# Patient Record
Sex: FEMALE | Race: WHITE | Employment: UNEMPLOYED | URBAN - METROPOLITAN AREA
[De-identification: names, ages, dates, MRNs, and addresses within clinical notes are randomized per-mention and may not be internally consistent; named-entity substitution may affect disease eponyms.]

---

## 2020-01-27 ENCOUNTER — OFFICE VISIT (OUTPATIENT)
Dept: FAMILY MEDICINE CLINIC | Age: 40
End: 2020-01-27
Payer: COMMERCIAL

## 2020-01-27 VITALS
HEART RATE: 60 BPM | SYSTOLIC BLOOD PRESSURE: 120 MMHG | DIASTOLIC BLOOD PRESSURE: 76 MMHG | TEMPERATURE: 98.2 F | RESPIRATION RATE: 18 BRPM | BODY MASS INDEX: 23.66 KG/M2 | WEIGHT: 142 LBS | HEIGHT: 65 IN | OXYGEN SATURATION: 99 %

## 2020-01-27 LAB — S PYO AG THROAT QL: NORMAL

## 2020-01-27 PROCEDURE — 99213 OFFICE O/P EST LOW 20 MIN: CPT | Performed by: PHYSICIAN ASSISTANT

## 2020-01-27 PROCEDURE — G8484 FLU IMMUNIZE NO ADMIN: HCPCS | Performed by: PHYSICIAN ASSISTANT

## 2020-01-27 PROCEDURE — G8427 DOCREV CUR MEDS BY ELIG CLIN: HCPCS | Performed by: PHYSICIAN ASSISTANT

## 2020-01-27 PROCEDURE — G8420 CALC BMI NORM PARAMETERS: HCPCS | Performed by: PHYSICIAN ASSISTANT

## 2020-01-27 PROCEDURE — 87880 STREP A ASSAY W/OPTIC: CPT | Performed by: PHYSICIAN ASSISTANT

## 2020-01-27 PROCEDURE — 1036F TOBACCO NON-USER: CPT | Performed by: PHYSICIAN ASSISTANT

## 2020-01-27 RX ORDER — CEFDINIR 300 MG/1
300 CAPSULE ORAL 2 TIMES DAILY
Qty: 20 CAPSULE | Refills: 0 | Status: SHIPPED | OUTPATIENT
Start: 2020-01-27 | End: 2020-02-06

## 2020-01-27 RX ORDER — ZOLPIDEM TARTRATE 10 MG/1
TABLET ORAL
COMMUNITY
End: 2020-03-13 | Stop reason: ALTCHOICE

## 2020-03-13 ENCOUNTER — OFFICE VISIT (OUTPATIENT)
Dept: FAMILY MEDICINE CLINIC | Age: 40
End: 2020-03-13
Payer: COMMERCIAL

## 2020-03-13 VITALS
TEMPERATURE: 98.8 F | SYSTOLIC BLOOD PRESSURE: 124 MMHG | DIASTOLIC BLOOD PRESSURE: 78 MMHG | BODY MASS INDEX: 23.46 KG/M2 | WEIGHT: 141 LBS | HEART RATE: 95 BPM | RESPIRATION RATE: 18 BRPM

## 2020-03-13 PROCEDURE — 69210 REMOVE IMPACTED EAR WAX UNI: CPT | Performed by: PHYSICIAN ASSISTANT

## 2020-03-13 PROCEDURE — G8427 DOCREV CUR MEDS BY ELIG CLIN: HCPCS | Performed by: PHYSICIAN ASSISTANT

## 2020-03-13 PROCEDURE — 99214 OFFICE O/P EST MOD 30 MIN: CPT | Performed by: PHYSICIAN ASSISTANT

## 2020-03-13 PROCEDURE — G8420 CALC BMI NORM PARAMETERS: HCPCS | Performed by: PHYSICIAN ASSISTANT

## 2020-03-13 PROCEDURE — G8484 FLU IMMUNIZE NO ADMIN: HCPCS | Performed by: PHYSICIAN ASSISTANT

## 2020-03-13 PROCEDURE — 1036F TOBACCO NON-USER: CPT | Performed by: PHYSICIAN ASSISTANT

## 2020-03-13 RX ORDER — CHLORPHENIRAMINE MALEATE 4 MG/1
4 TABLET ORAL EVERY 6 HOURS PRN
Qty: 20 TABLET | Refills: 0 | Status: SHIPPED
Start: 2020-03-13 | End: 2021-02-08

## 2020-03-13 RX ORDER — METHYLPREDNISOLONE 4 MG/1
TABLET ORAL
Qty: 1 KIT | Refills: 0 | Status: SHIPPED
Start: 2020-03-13 | End: 2021-02-08

## 2020-03-13 RX ORDER — AMOXICILLIN AND CLAVULANATE POTASSIUM 875; 125 MG/1; MG/1
1 TABLET, FILM COATED ORAL 2 TIMES DAILY
Qty: 20 TABLET | Refills: 0 | Status: SHIPPED | OUTPATIENT
Start: 2020-03-13 | End: 2020-03-23

## 2020-03-13 NOTE — PROGRESS NOTES
3/13/20  Dougie Pinon : 1980 Sex: female  Age 37 y.o. Subjective:  Chief Complaint   Patient presents with    Otalgia     Started Monday antibiotic drops made it worse     Cough    Chest Congestion         HPI:   Dougie Pinon , 37 y.o. female presents to [facility] care for evaluation of ear pain, cough, congestion, drainage. The patient has had the symptoms for about a week now. The patient was actually evaluated in [facility] and was diagnosed with a right otitis externa and evidence of a left cerumen impaction. The patient states that the drops are actually making the symptoms worse. The patient is still coughing still congested. The patient is on any chest pain, shortness of breath, abdominal pain. No back pain or flank pain. The patient is eating and drinking normally. ROS:   Unless otherwise stated in this report the patient's positive and negative responses for review of systems for constitutional, eyes, ENT, cardiovascular, respiratory, gastrointestinal, neurological, , musculoskeletal, and integument systems and related systems to the presenting problem are either stated in the history of present illness or were not pertinent or were negative for the symptoms and/or complaints related to the presenting medical problem. Positives and pertinent negatives as per HPI. All others reviewed and are negative. PMH:     Past Medical History:   Diagnosis Date    Abnormal Pap smear     Anxiety     [facility]     Asthma     Hx LEEP (loop electrosurgical excision procedure), cervix, pregnancy     Major depressive disorder, recurrent episode, unspecified 2011    Mental disorders of mother, antepartum 2011       Past Surgical History:   Procedure Laterality Date    BREAST SURGERY      abscess removed from left breast     SECTION      LEEP         History reviewed. No pertinent family history.     Medications:     Current Outpatient Medications:   amoxicillin-clavulanate (AUGMENTIN) 875-125 MG per tablet, Take 1 tablet by mouth 2 times daily for 10 days, Disp: 20 tablet, Rfl: 0    methylPREDNISolone (MEDROL DOSEPACK) 4 MG tablet, Take by mouth., Disp: 1 kit, Rfl: 0    chlorpheniramine (ALLER-CHLOR) 4 MG tablet, Take 1 tablet by mouth every 6 hours as needed for Allergies, Disp: 20 tablet, Rfl: 0    ALPRAZolam (XANAX) 1 MG tablet, , Disp: , Rfl:     Allergies:   No Known Allergies    Social History:     Social History     Tobacco Use    Smoking status: Former Smoker    Smokeless tobacco: Never Used   Substance Use Topics    Alcohol use: Yes     Comment: occassionaly    Drug use: No       Patient lives at home. Physical Exam:     Vitals:    03/13/20 1154   BP: 124/78   Pulse: 95   Resp: 18   Temp: 98.8 °F (37.1 °C)   SpO2: Comment: unable to assess due to artificial nails   Weight: 141 lb (64 kg)       Exam:  Physical Exam  Nurse's notes and vital signs reviewed. The patient is not hypoxic. ? General: Alert, no acute distress, patient resting comfortably Patient is not toxic or lethargic. Skin: Warm, intact, no pallor noted. There is no evidence of rash at this time. Head: Normocephalic, atraumatic  Eye: Normal conjunctiva  Ears, Nose, Throat: Right tympanic membrane erythematous and bulging but no evidence of perforation, no foreign body, left tympanic membrane not able to be visualized because of cerumen impaction. No drainage or discharge noted. No pre- or post-auricular tenderness, erythema, or swelling noted. Moderate nasal congestion, rhinorrhea, no epistaxis  Posterior oropharynx shows erythema and cobblestoning but no evidence of tonsillar hypertrophy, or exudate. the uvula is midline. No trismus or drooling is noted. Moist mucous membranes. Neck: No anterior/posterior lymphadenopathy noted. No erythema, no masses, no fluctuance or induration noted. No meningeal signs.   Cardiovascular: Regular Rate and Rhythm  Respiratory: No

## 2020-03-18 ENCOUNTER — OFFICE VISIT (OUTPATIENT)
Dept: PRIMARY CARE CLINIC | Age: 40
End: 2020-03-18
Payer: COMMERCIAL

## 2020-03-18 VITALS
HEIGHT: 65 IN | TEMPERATURE: 98.3 F | SYSTOLIC BLOOD PRESSURE: 130 MMHG | BODY MASS INDEX: 23.49 KG/M2 | RESPIRATION RATE: 14 BRPM | WEIGHT: 141 LBS | DIASTOLIC BLOOD PRESSURE: 90 MMHG | HEART RATE: 84 BPM | OXYGEN SATURATION: 99 %

## 2020-03-18 PROCEDURE — G8420 CALC BMI NORM PARAMETERS: HCPCS | Performed by: FAMILY MEDICINE

## 2020-03-18 PROCEDURE — 99213 OFFICE O/P EST LOW 20 MIN: CPT | Performed by: FAMILY MEDICINE

## 2020-03-18 PROCEDURE — 1036F TOBACCO NON-USER: CPT | Performed by: FAMILY MEDICINE

## 2020-03-18 PROCEDURE — G8484 FLU IMMUNIZE NO ADMIN: HCPCS | Performed by: FAMILY MEDICINE

## 2020-03-18 PROCEDURE — G8427 DOCREV CUR MEDS BY ELIG CLIN: HCPCS | Performed by: FAMILY MEDICINE

## 2020-03-18 RX ORDER — BENZONATATE 100 MG/1
100 CAPSULE ORAL 2 TIMES DAILY PRN
Qty: 20 CAPSULE | Refills: 0 | Status: SHIPPED | OUTPATIENT
Start: 2020-03-18 | End: 2020-03-28

## 2020-03-18 NOTE — LETTER
[address]  [facility] [address]  Phone: [phone]  Fax: [phone]    Kat Patino MD        March 18, 2020     Patient: Mila Mcginnis   YOB: 1980   Date of Visit: 3/18/2020       To Whom it May Concern:    Mila Mcginnis was seen in my clinic on 3/18/2020. She may return to work on 2 weeks. If you have any questions or concerns, please don't hesitate to call.     Sincerely,         Kat Patino MD

## 2020-03-18 NOTE — PATIENT INSTRUCTIONS
The results and recommendations made for you today reflect your status at this time. Your condition is subject to change. Should your symptoms worsen, please seek additional medical attention. Preventing the Spread of Coronavirus Disease 2019 in Homes and Residential Communities  Interim Guidance      Prevention steps for people with confirmed or suspected COVID-19 (including persons under investigation) who do not need to be hospitalized and people with confirmed COVID-19 who were hospitalized and determined to be medically stable to go home    Your healthcare provider and public health staff will evaluate whether you can be cared for at home. If it is determined that you do not need to be hospitalized and can be isolated at home, you will be monitored by staff from your local or state health department. You should follow the prevention steps below until a healthcare provider or local or state health department says you can return to your normal activities. Stay home except to get medical care  People who are mildly ill with COVID-19 are able to isolate at home during their illness. You should restrict activities outside your home, except for getting medical care. Do not go to work, school, or public areas. Avoid using public transportation, ride-sharing, or taxis. Separate yourself from other people and animals in your home  People: As much as possible, you should stay in a specific room and away from other people in your home. Also, you should use a separate bathroom, if available. Animals: You should restrict contact with pets and other animals while you are sick with COVID-19, just like you would around other people. Although there have not been reports of pets or other animals becoming sick with COVID-19, it is still recommended that people sick with COVID-19 limit contact with animals until more information is known about the virus.  When possible, have another member of your household care for your animals while you are sick. If you are sick with COVID-19, avoid contact with your pet, including petting, snuggling, being kissed or licked, and sharing food. If you must care for your pet or be around animals while you are sick, wash your hands before and after you interact with pets and wear a facemask. See COVID-19 and Animals for more information. Call ahead before visiting your doctor  If you have a medical appointment, call the healthcare provider and tell them that you have or may have COVID-19. This will help the healthcare providers office take steps to keep other people from getting infected or exposed. Wear a facemask  You should wear a facemask when you are around other people (e.g., sharing a room or vehicle) or pets and before you enter a healthcare providers office. If you are not able to wear a facemask (for example, because it causes trouble breathing), then people who live with you should not stay in the same room with you, or they should wear a facemask if they enter your room. Cover your coughs and sneezes  Cover your mouth and nose with a tissue when you cough or sneeze. Throw used tissues in a lined trash can. Immediately wash your hands with soap and water for at least 20 seconds or, if soap and water are not available, clean your hands with an alcohol-based hand  that contains at least 60% alcohol. Clean your hands often  Wash your hands often with soap and water for at least 20 seconds, especially after blowing your nose, coughing, or sneezing; going to the bathroom; and before eating or preparing food. If soap and water are not readily available, use an alcohol-based hand  with at least 60% alcohol, covering all surfaces of your hands and rubbing them together until they feel dry. Soap and water are the best option if hands are visibly dirty. Avoid touching your eyes, nose, and mouth with unwashed hands.     Avoid sharing personal household items  You should not share dishes, drinking glasses, cups, eating utensils, towels, or bedding with other people or pets in your home. After using these items, they should be washed thoroughly with soap and water. Clean all high-touch surfaces everyday  High touch surfaces include counters, tabletops, doorknobs, bathroom fixtures, toilets, phones, keyboards, tablets, and bedside tables. Also, clean any surfaces that may have blood, stool, or body fluids on them. Use a household cleaning spray or wipe, according to the label instructions. Labels contain instructions for safe and effective use of the cleaning product including precautions you should take when applying the product, such as wearing gloves and making sure you have good ventilation during use of the product. Monitor your symptoms  Seek prompt medical attention if your illness is worsening (e.g., difficulty breathing). Before seeking care, call your healthcare provider and tell them that you have, or are being evaluated for, COVID-19. Put on a facemask before you enter the facility. These steps will help the healthcare providers office to keep other people in the office or waiting room from getting infected or exposed. Ask your healthcare provider to call the local or state health department. Persons who are placed under active monitoring or facilitated self-monitoring should follow instructions provided by their local health department or occupational health professionals, as appropriate. If you have a medical emergency and need to call 911, notify the dispatch personnel that you have, or are being evaluated for COVID-19. If possible, put on a facemask before emergency medical services arrive. Discontinuing home isolation  Patients with confirmed COVID-19 should remain under home isolation precautions until the risk of secondary transmission to others is thought to be low.  The decision to discontinue home isolation precautions should be made on a case-by-case basis, in consultation with healthcare providers and state and local health departments. Recommended precautions for household members, intimate partners, and caregivers in a nonhealthcare setting a patient with symptomatic laboratory-confirmed COVID-19 or a patient under investigation (PUI)    Household members, intimate partners, and caregivers in a nonhealthcare setting may have close contact with a person with symptomatic, laboratory-confirmed COVID-19 or a person under investigation. Close contacts should monitor their health; they should call their healthcare provider right away if they develop symptoms suggestive of COVID-19 (e.g., fever, cough, shortness of breath) (see Interim [place] Guidance for Risk Assessment and Public Health Management of Persons with Potential Coronavirus Disease 2019 (COVID-19) Exposure in Travel-associated or [employer].)    Close contacts should also follow these recommendations:    -Make sure that you understand and can help the patient follow their healthcare providers instructions for medication(s) and care. You should help the patient with basic needs in the home and provide support for getting groceries, prescriptions, and other personal needs. -Monitor the patients symptoms. If the patient is getting sicker, call his or her healthcare provider and tell them that the patient has laboratory-confirmed COVID-19. This will help the healthcare providers office take steps to keep other people in the office or waiting room from getting infected. Ask the healthcare provider to call the local or state health department for additional guidance. If the patient has a medical emergency and you need to call 911, notify the dispatch personnel that the patient has, or is being evaluated for COVID-19.  -Household members should stay in another room or be  from the patient as much as possible.  Household members should use a separate bedroom and bathroom, if available. -Prohibit visitors who do not have an essential need to be in the home.  -Household members should care for any pets in the home. Do not handle pets or other animals while sick. For more information, see COVID-19 and Animals. -Make sure that shared spaces in the home have good air flow, such as by an air conditioner or an opened window, weather permitting.  -Perform hand hygiene frequently. Wash your hands often with soap and water for at least 20 seconds or use an alcohol-based hand  that contains 60 to 95% alcohol, covering all surfaces of your hands and rubbing them together until they feel dry. Soap and water should be used preferentially if hands are visibly dirty.  -Avoid touching your eyes, nose, and mouth with unwashed hands.  -The patient should wear a facemask when you are around other people. If the patient is not able to wear a facemask (for example, because it causes trouble breathing), you, as the caregiver, should wear a mask when you are in the same room as the patient.  -Wear a disposable facemask and gloves when you touch or have contact with the patients blood, stool, or body fluids, such as saliva, sputum, nasal mucus, vomit, urine.  -Throw out disposable facemasks and gloves after using them. Do not reuse.  -When removing personal protective equipment, first remove and dispose of gloves. Then, immediately clean your hands with soap and water or alcohol-based hand . Next, remove and dispose of facemask, and immediately clean your hands again with soap and water or alcohol-based hand .  -Avoid sharing household items with the patient. You should not share dishes, drinking glasses, cups, eating utensils, towels, bedding, or other items. After the patient uses these items, you should wash them thoroughly (see below [employer]).   -Clean all high-touch surfaces, such as counters, tabletops, doorknobs, bathroom fixtures, toilets,

## 2021-02-08 ENCOUNTER — OFFICE VISIT (OUTPATIENT)
Dept: PRIMARY CARE CLINIC | Age: 41
End: 2021-02-08
Payer: COMMERCIAL

## 2021-02-08 VITALS
BODY MASS INDEX: 28.27 KG/M2 | RESPIRATION RATE: 16 BRPM | HEART RATE: 76 BPM | TEMPERATURE: 97.7 F | DIASTOLIC BLOOD PRESSURE: 76 MMHG | WEIGHT: 144 LBS | HEIGHT: 60 IN | SYSTOLIC BLOOD PRESSURE: 112 MMHG | OXYGEN SATURATION: 97 %

## 2021-02-08 DIAGNOSIS — M54.41 CHRONIC RIGHT-SIDED LOW BACK PAIN WITH RIGHT-SIDED SCIATICA: ICD-10-CM

## 2021-02-08 DIAGNOSIS — E03.8 SUBCLINICAL HYPOTHYROIDISM: ICD-10-CM

## 2021-02-08 DIAGNOSIS — F33.1 MODERATE EPISODE OF RECURRENT MAJOR DEPRESSIVE DISORDER (HCC): ICD-10-CM

## 2021-02-08 DIAGNOSIS — Z76.89 ESTABLISHING CARE WITH NEW DOCTOR, ENCOUNTER FOR: ICD-10-CM

## 2021-02-08 DIAGNOSIS — Z13.220 LIPID SCREENING: ICD-10-CM

## 2021-02-08 DIAGNOSIS — M54.41 CHRONIC RIGHT-SIDED LOW BACK PAIN WITH RIGHT-SIDED SCIATICA: Primary | ICD-10-CM

## 2021-02-08 DIAGNOSIS — G89.29 CHRONIC RIGHT-SIDED LOW BACK PAIN WITH RIGHT-SIDED SCIATICA: ICD-10-CM

## 2021-02-08 DIAGNOSIS — L30.9 DERMATITIS: Primary | ICD-10-CM

## 2021-02-08 DIAGNOSIS — M79.7 FIBROMYALGIA: ICD-10-CM

## 2021-02-08 DIAGNOSIS — F41.9 ANXIETY: ICD-10-CM

## 2021-02-08 DIAGNOSIS — E55.9 VITAMIN D DEFICIENCY: ICD-10-CM

## 2021-02-08 DIAGNOSIS — G89.29 CHRONIC RIGHT-SIDED LOW BACK PAIN WITH RIGHT-SIDED SCIATICA: Primary | ICD-10-CM

## 2021-02-08 LAB
ALBUMIN SERPL-MCNC: 4.5 G/DL (ref 3.5–5.2)
ALP BLD-CCNC: 38 U/L (ref 35–104)
ALT SERPL-CCNC: 9 U/L (ref 0–32)
ANION GAP SERPL CALCULATED.3IONS-SCNC: 17 MMOL/L (ref 7–16)
AST SERPL-CCNC: 21 U/L (ref 0–31)
BASOPHILS ABSOLUTE: 0.04 E9/L (ref 0–0.2)
BASOPHILS RELATIVE PERCENT: 1 % (ref 0–2)
BILIRUB SERPL-MCNC: 0.2 MG/DL (ref 0–1.2)
BUN BLDV-MCNC: 22 MG/DL (ref 6–20)
CALCIUM SERPL-MCNC: 9.5 MG/DL (ref 8.6–10.2)
CHLORIDE BLD-SCNC: 108 MMOL/L (ref 98–107)
CHOLESTEROL, TOTAL: 205 MG/DL (ref 0–199)
CO2: 18 MMOL/L (ref 22–29)
CREAT SERPL-MCNC: 0.6 MG/DL (ref 0.5–1)
EOSINOPHILS ABSOLUTE: 0.18 E9/L (ref 0.05–0.5)
EOSINOPHILS RELATIVE PERCENT: 4.4 % (ref 0–6)
GFR AFRICAN AMERICAN: >60
GFR NON-AFRICAN AMERICAN: >60 ML/MIN/1.73
GLUCOSE BLD-MCNC: 84 MG/DL (ref 74–99)
HCT VFR BLD CALC: 40.2 % (ref 34–48)
HDLC SERPL-MCNC: 116 MG/DL
HEMOGLOBIN: 12.2 G/DL (ref 11.5–15.5)
IMMATURE GRANULOCYTES #: 0.01 E9/L
IMMATURE GRANULOCYTES %: 0.2 % (ref 0–5)
LDL CHOLESTEROL CALCULATED: 82 MG/DL (ref 0–99)
LYMPHOCYTES ABSOLUTE: 1.77 E9/L (ref 1.5–4)
LYMPHOCYTES RELATIVE PERCENT: 43.3 % (ref 20–42)
MCH RBC QN AUTO: 31.9 PG (ref 26–35)
MCHC RBC AUTO-ENTMCNC: 30.3 % (ref 32–34.5)
MCV RBC AUTO: 105.2 FL (ref 80–99.9)
MONOCYTES ABSOLUTE: 0.41 E9/L (ref 0.1–0.95)
MONOCYTES RELATIVE PERCENT: 10 % (ref 2–12)
NEUTROPHILS ABSOLUTE: 1.68 E9/L (ref 1.8–7.3)
NEUTROPHILS RELATIVE PERCENT: 41.1 % (ref 43–80)
PDW BLD-RTO: 13 FL (ref 11.5–15)
PLATELET # BLD: 240 E9/L (ref 130–450)
PMV BLD AUTO: 11.1 FL (ref 7–12)
POTASSIUM SERPL-SCNC: 4.6 MMOL/L (ref 3.5–5)
RBC # BLD: 3.82 E12/L (ref 3.5–5.5)
RHEUMATOID FACTOR: 10 IU/ML (ref 0–13)
SEDIMENTATION RATE, ERYTHROCYTE: 5 MM/HR (ref 0–20)
SODIUM BLD-SCNC: 143 MMOL/L (ref 132–146)
T4 FREE: 1.11 NG/DL (ref 0.93–1.7)
TOTAL PROTEIN: 7.6 G/DL (ref 6.4–8.3)
TRIGL SERPL-MCNC: 37 MG/DL (ref 0–149)
TSH SERPL DL<=0.05 MIU/L-ACNC: 1.24 UIU/ML (ref 0.27–4.2)
VITAMIN D 25-HYDROXY: 38 NG/ML (ref 30–100)
VLDLC SERPL CALC-MCNC: 7 MG/DL
WBC # BLD: 4.1 E9/L (ref 4.5–11.5)

## 2021-02-08 PROCEDURE — G8419 CALC BMI OUT NRM PARAM NOF/U: HCPCS | Performed by: FAMILY MEDICINE

## 2021-02-08 PROCEDURE — G8484 FLU IMMUNIZE NO ADMIN: HCPCS | Performed by: FAMILY MEDICINE

## 2021-02-08 PROCEDURE — 99214 OFFICE O/P EST MOD 30 MIN: CPT | Performed by: FAMILY MEDICINE

## 2021-02-08 PROCEDURE — G8427 DOCREV CUR MEDS BY ELIG CLIN: HCPCS | Performed by: FAMILY MEDICINE

## 2021-02-08 PROCEDURE — 1036F TOBACCO NON-USER: CPT | Performed by: FAMILY MEDICINE

## 2021-02-08 PROCEDURE — 93000 ELECTROCARDIOGRAM COMPLETE: CPT | Performed by: FAMILY MEDICINE

## 2021-02-08 RX ORDER — DESVENLAFAXINE 50 MG/1
1 TABLET, EXTENDED RELEASE ORAL DAILY
COMMUNITY
Start: 2021-01-29 | End: 2022-05-25

## 2021-02-08 RX ORDER — DEXTROAMPHETAMINE SACCHARATE, AMPHETAMINE ASPARTATE MONOHYDRATE, DEXTROAMPHETAMINE SULFATE AND AMPHETAMINE SULFATE 6.25; 6.25; 6.25; 6.25 MG/1; MG/1; MG/1; MG/1
1 CAPSULE, EXTENDED RELEASE ORAL 2 TIMES DAILY
COMMUNITY
Start: 2021-01-29

## 2021-02-08 RX ORDER — TRAZODONE HYDROCHLORIDE 150 MG/1
1 TABLET ORAL DAILY
COMMUNITY
Start: 2021-01-29

## 2021-02-08 ASSESSMENT — ENCOUNTER SYMPTOMS
SORE THROAT: 0
PHOTOPHOBIA: 0
EYE REDNESS: 0
SHORTNESS OF BREATH: 0
NAUSEA: 0
BLOOD IN STOOL: 0
DIARRHEA: 0
ABDOMINAL PAIN: 0
VOMITING: 0
COUGH: 0
BACK PAIN: 1
RHINORRHEA: 0
CONSTIPATION: 0
WHEEZING: 0

## 2021-02-08 NOTE — PROGRESS NOTES
2021    Chief Complaint   Patient presents with   [facility] Established New Doctor    Eczema     mostly on face, ears, and scalp    Excessive Sweating     under arm, has had issue since she was younger    Lower Back Pain     feels like she needs MRI pain gradually getting worse      HPI  Brenna Fontanez (:  1980) is a 41 y.o. female, here for evaluation of the following medical concerns:    Patient is a 26-year-old female with a past medical history subclinical hypothyroidism, chronic low back pain who presents today to establish care and myself. Anxiety/Depression: Not under good control. No SI/HI. Patient is following with psychiatry regards this issue. Patient reports that they're actively changing her medications due to poor control. Patient is on several medications that are also used to help manage her migraines. Patient still has several migraines throughout the month. Patient does have aura with floaters and visual changes per her report. Eczema: Dandruff, patches on her face. Started about a few months ago. Reports she has tried OTC therapies without relief. Patient had autoimmune work-up in the past and repeat ordered which she did not complete. Excessive sweating from armpits: Ongoing since childhood. Low Back Pain: Patient reports issues with this for years. Reports days when she can not walk due to pain down right leg and radicular symptoms. Patient reports pain along the right side with radiation down her right leg. No red flag symptoms. Reports abnormal MRI in the past. Has tried PT. Patient reports that she has scoliosis and possibly DDD in the lumbar spine. Ob/GYN: Patient has not seen an Kaela Garcia in several years. Patient reports abnormal cramping in her lower pelvic region that is especially worse during her menstrual cycles. Patient will need follow-up with ObGyn for this issue. HM needs updated.      Review of Systems Constitutional: Negative for chills and fever. HENT: Negative for congestion, hearing loss, postnasal drip, rhinorrhea, sneezing, sore throat and tinnitus. Eyes: Negative for photophobia and redness. Respiratory: Negative for cough, shortness of breath and wheezing. Cardiovascular: Negative for chest pain, palpitations and leg swelling. Gastrointestinal: Negative for abdominal pain, blood in stool, constipation, diarrhea, nausea and vomiting. Endocrine: Negative for polydipsia and polyuria. Genitourinary: Negative for difficulty urinating, dysuria and hematuria. Musculoskeletal: Positive for arthralgias, back pain and neck pain. Skin: Negative for rash. Neurological: Positive for weakness. Negative for dizziness, light-headedness, numbness and headaches. Psychiatric/Behavioral: Positive for decreased concentration. Negative for suicidal ideas. The patient is nervous/anxious. Prior to Visit Medications    Medication Sig Taking? Authorizing Provider   topiramate (TOPAMAX) 200 MG tablet Take 1 tablet by mouth daily Yes Historical Provider, MD   desvenlafaxine succinate (PRISTIQ) 50 MG TB24 extended release tablet Take 1 tablet by mouth daily Yes Historical Provider, MD   amphetamine-dextroamphetamine (ADDERALL XR) 25 MG extended release capsule Take 1 capsule by mouth 2 times daily.  Yes Historical Provider, MD   traZODone (DESYREL) 150 MG tablet Take 1 tablet by mouth daily Yes Historical Provider, MD   ALPRAZolam [place]) 1 MG tablet  Yes Historical Provider, MD        No Known Allergies    Past Medical History:   Diagnosis Date    Abnormal Pap smear     Anxiety     [facility]     Asthma     Hx LEEP (loop electrosurgical excision procedure), cervix, pregnancy     Major depressive disorder, recurrent episode, unspecified 2011    Mental disorders of mother, antepartum 2011        Menstrual History:  OB History        5    Para   4    Term   4         AB        Living   3       SAB        TAB        Ectopic        Molar        Multiple   1    Live Births   3                    Past Surgical History:   Procedure Laterality Date    BREAST SURGERY      abscess removed from left breast     SECTION      LEEP         Family History   Problem Relation Age of Onset    Breast Cancer Father        Immunization History   Administered Date(s) Administered    Influenza Virus Vaccine 10/21/2015       Health Maintenance   Topic Date Due    Hepatitis C screen  1980    DTaP/Tdap/Td vaccine (1 - Tdap) 10/10/1997    Cervical cancer screen  10/10/1999    Lipid screen  10/10/2018    Breast cancer screen  10/10/2018    Diabetes screen  10/10/2018    Flu vaccine (1) 2020    HIV screen  Completed    Hepatitis A vaccine  Aged Out    Hepatitis B vaccine  Aged Out    Hib vaccine  Aged Out    Meningococcal (ACWY) vaccine  Aged Out    Pneumococcal 0-64 years Vaccine  Aged Out       Social History     Socioeconomic History    Marital status:      Spouse name: Not on file    Number of children: Not on file    Years of education: Not on file    Highest education level: Not on file   Occupational History    Not on file   Social Needs    Financial resource strain: Not on file    Food insecurity     Worry: Not on file     Inability: Not on file   [place] needs     Medical: Not on file     Non-medical: Not on file   Tobacco Use    Smoking status: Former Smoker    Smokeless tobacco: Never Used   Substance and Sexual Activity    Alcohol use: Yes     Frequency: Never     Drinks per session: 1 or 2     Binge frequency: Never     Comment: occassionaly    Drug use: No    Sexual activity: Yes     Partners: Male   Lifestyle    Physical activity     Days per week: Not on file     Minutes per session: Not on file    Stress: Not on file   Relationships    Social connections     Talks on phone: Not on file Gets together: Not on file     Attends Congregation service: Not on file     Active member of club or organization: Not on file     Attends meetings of clubs or organizations: Not on file     Relationship status: Not on file    Intimate partner violence     Fear of current or ex partner: Not on file     Emotionally abused: Not on file     Physically abused: Not on file     Forced sexual activity: Not on file   Other Topics Concern    Not on file   Social History Narrative    Not on file         Vitals:    02/08/21 0954   BP: 112/76   Pulse: 76   Resp: 16   Temp: 97.7 °F (36.5 °C)   TempSrc: Temporal   SpO2: 97%   Weight: 144 lb (65.3 kg)   Height: 5' (1.524 m)       Estimated body mass index is 28.12 kg/m² as calculated from the following:    Height as of this encounter: 5' (1.524 m). Weight as of this encounter: 144 lb (65.3 kg). Physical Exam  Vitals signs and nursing note reviewed. Constitutional:       Appearance: She is well-developed. HENT:      Head: Normocephalic. Right Ear: External ear normal.      Left Ear: External ear normal.   Eyes:      Extraocular Movements: Extraocular movements intact. Conjunctiva/sclera: Conjunctivae normal.      Pupils: Pupils are equal, round, and reactive to light. Cardiovascular:      Rate and Rhythm: Normal rate and regular rhythm. Pulses:           Radial pulses are 2+ on the right side and 2+ on the left side. Dorsalis pedis pulses are 2+ on the right side and 2+ on the left side. Posterior tibial pulses are 2+ on the right side and 2+ on the left side. Heart sounds: Normal heart sounds. No murmur. Comments: Capillary refill normal.  Pulmonary:      Effort: Pulmonary effort is normal. No respiratory distress. Breath sounds: Normal breath sounds. No decreased breath sounds, wheezing or rales. Abdominal:      General: Bowel sounds are normal. There is no distension. Palpations: Abdomen is soft. Tenderness: There is no abdominal tenderness. There is no rebound. Musculoskeletal: Normal range of motion. Lumbar back: She exhibits no bony tenderness and no deformity. Right lower leg: No edema. Left lower leg: No edema. Comments: Straight leg raiser is negative   bilaterally. Mild decrease in muscle strength secondary to pain. Mild decreased range of motion of the lumbar spine secondary to pain. Pain on palpation of the right paraspinal musculature. Skin:     General: Skin is warm and dry. Findings: No rash. Neurological:      Mental Status: She is alert and oriented to person, place, and time. Cranial Nerves: No cranial nerve deficit. Sensory: No sensory deficit. Deep Tendon Reflexes:      Reflex Scores:       Patellar reflexes are 2+ on the right side and 2+ on the left side. Achilles reflexes are 2+ on the right side and 2+ on the left side. Comments: Sensation to light touch intact throughout lower extremities. Psychiatric:         Behavior: Behavior normal.         Patient Active Problem List    Diagnosis Date Noted    Chronic back pain 02/12/2014    Chronic neck pain 12/20/2013    Hand pain 11/18/2013    Abnormal thyroid blood test 11/15/2013    Fibromyalgia 10/31/2013    Anxiety 10/11/2013    Previous LEEP 08/05/2011    Major depressive disorder, recurrent episode ([facility] [address] 08/05/2011    Opioid type dependence ([facility] [address] 08/05/2011         ASSESSMENT/PLAN:  Jason Ellsworth was seen today for established new doctor, eczema, excessive sweating and lower back pain. Diagnoses and all orders for this visit:    Dermatitis  -     External Referral To Dermatology  Will do an autoimmune workup as noted below. Referral to dermatology for additional workup and management. Patient has dandruff issues and erythematous patches throughout her face. Consider antifungal cream at next office appointment. Moderate episode of recurrent major depressive disorder [facility])  Patient is following with psychiatry regards to this issue. No SI/HI currently. Patient is not well controlled. She is undergoing multiple changes in her medications to better obtain control. This is chronic ongoing issues. Anxiety  -     EKG 12 lead; Future  -     EKG 12 lead  Patient is following with psychiatry regards to this issue. No SI/HI currently. Patient is not well controlled. She is undergoing multiple changes in her medications to better obtain control. This is chronic ongoing issues. EKG Normal Sinus Today. Fibromyalgia  -     ELDA; Future  -     Sedimentation Rate; Future  -     Rheumatoid Factor; Future  -     CCP Antibodies, IGG/IGA; Future  Patient has multiple aches and pains including chronic neck pain and low back pain. Autoimmune workup as noted above. Possible referrals include pain management, PMNR, chiropractic care, neurosurgery. Chronic right-sided low back pain with right-sided sciatica  -     XR LUMBAR SPINE (MIN 4 VIEWS); Future  Mild symptoms today. Patient does report radicular-like symptoms in the past. Check x-ray. Consider referral to physical therapy and chiropractic care. If patient fails conservative management will order MRI. Establishing care with new doctor, encounter for  -     CBC Auto Differential; Future  -     Comprehensive Metabolic Panel; Future  Previous medical records in the chart briefly reviewed. Chart updated today. Patient will need to follow-up with ObGyn. Lipid screening  -     Lipid Panel; Future    Subclinical hypothyroidism  -     TSH without Reflex; Future  -     T4, FREE; Future    Vitamin D deficiency  -     Vitamin D 25 Hydroxy; Future    Patient is to complete her labs prior to next office appointment. We'll contact patient with her x-ray findings. Patient will need follow-up with ObGyn. Check orthostatics at next apt. Migraines, consider CT or MRI. Consider referral to neuro. Health Maintenance reviewed, needs to see OB/GYN    Return in about 4 weeks (around 3/8/2021). Educational materials and/or home exercises printed for patient's review and were included in patient instructions on his/her After Visit Summary and given to patient at the end of visit.       Counseled regarding above diagnosis, including possible risks and complications,  especially if left uncontrolled.     Counseled regarding the possible side effects, risks, benefits and alternatives to treatment; patient and/or guardianverbalizes understanding, agrees, feels comfortable with and wishes to proceed with above treatment plan.     Advised patient to call with any new medication issues, and read all Rx info from pharmacy to assure aware of all possible risks and side effects of medication before taking.     Reviewed age and gender appropriate health screening exams and vaccinations. Advised patient regarding importance of keeping up withrecommended health maintenance and to schedule as soon as possible if overdue, as this is important in assessing for undiagnosed pathology, especially cancer, as well as protecting against potentially harmful/lifethreatening disease.       Patient and/or guardian verbalizes understanding and agrees with abovecounseling, assessment and plan.     All questions answered. An  electronic signature was used to authenticatethis note.     --Giovany Lagos MD on 2/8/21 at 8:16 AM EST

## 2021-02-09 LAB — ANTI-NUCLEAR ANTIBODY (ANA): NEGATIVE

## 2021-02-11 LAB — CCP IGG ANTIBODIES: 3 UNITS (ref 0–19)

## 2021-02-23 ENCOUNTER — TELEPHONE (OUTPATIENT)
Dept: PRIMARY CARE CLINIC | Age: 41
End: 2021-02-23

## 2021-02-23 ENCOUNTER — OFFICE VISIT (OUTPATIENT)
Dept: CHIROPRACTIC MEDICINE | Age: 41
End: 2021-02-23
Payer: COMMERCIAL

## 2021-02-23 VITALS
SYSTOLIC BLOOD PRESSURE: 92 MMHG | HEIGHT: 60 IN | DIASTOLIC BLOOD PRESSURE: 60 MMHG | WEIGHT: 144 LBS | HEART RATE: 91 BPM | RESPIRATION RATE: 16 BRPM | BODY MASS INDEX: 28.27 KG/M2 | TEMPERATURE: 98.4 F | OXYGEN SATURATION: 97 %

## 2021-02-23 DIAGNOSIS — M99.03 SEGMENTAL AND SOMATIC DYSFUNCTION OF LUMBAR REGION: Primary | ICD-10-CM

## 2021-02-23 DIAGNOSIS — M51.36 LUMBAR DEGENERATIVE DISC DISEASE: ICD-10-CM

## 2021-02-23 PROCEDURE — 98940 CHIROPRACT MANJ 1-2 REGIONS: CPT | Performed by: CHIROPRACTOR

## 2021-02-23 PROCEDURE — 99203 OFFICE O/P NEW LOW 30 MIN: CPT | Performed by: CHIROPRACTOR

## 2021-02-23 PROCEDURE — G8419 CALC BMI OUT NRM PARAM NOF/U: HCPCS | Performed by: CHIROPRACTOR

## 2021-02-23 PROCEDURE — G8427 DOCREV CUR MEDS BY ELIG CLIN: HCPCS | Performed by: CHIROPRACTOR

## 2021-02-23 PROCEDURE — 1036F TOBACCO NON-USER: CPT | Performed by: CHIROPRACTOR

## 2021-02-23 PROCEDURE — G8484 FLU IMMUNIZE NO ADMIN: HCPCS | Performed by: CHIROPRACTOR

## 2021-02-23 ASSESSMENT — ENCOUNTER SYMPTOMS
BACK PAIN: 1
BOWEL INCONTINENCE: 0

## 2021-02-23 NOTE — PROGRESS NOTES
MHYX N NASCIMENTO CHIRO    21  Beata Oh : 1980 Sex: female  Age: 41 y.o. Patient was referred by Nat Bradley MD    Chief Complaint   Patient presents with    Lower Back Pain     Low back pain no known injury. Has had pain for several years. Daily pain  10 years -- had twins 9 years ago  Had PT several years ago. Maybe before giving birth. Back Pain  This is a chronic problem. The problem occurs constantly. The pain is present in the lumbar spine. The quality of the pain is described as aching. The pain radiates to the right knee. The pain is at a severity of 7/10. The pain is the same all the time. The symptoms are aggravated by bending, twisting and coughing. Stiffness is present in the morning. Associated symptoms include leg pain, numbness and tingling. Pertinent negatives include no bladder incontinence, bowel incontinence, fever or weight loss. She has tried heat for the symptoms. Red Flags:  none    Review of Systems   Constitutional: Negative for fever and weight loss. Gastrointestinal: Negative for bowel incontinence. Genitourinary: Negative for bladder incontinence. Musculoskeletal: Positive for back pain. Neurological: Positive for tingling and numbness. Current Outpatient Medications:     topiramate (TOPAMAX) 200 MG tablet, Take 1 tablet by mouth daily, Disp: , Rfl:     desvenlafaxine succinate (PRISTIQ) 50 MG TB24 extended release tablet, Take 1 tablet by mouth daily, Disp: , Rfl:     amphetamine-dextroamphetamine (ADDERALL XR) 25 MG extended release capsule, Take 1 capsule by mouth 2 times daily. , Disp: , Rfl:     traZODone (DESYREL) 150 MG tablet, Take 1 tablet by mouth daily, Disp: , Rfl:     ALPRAZolam (XANAX) 1 MG tablet, , Disp: , Rfl:     No Known Allergies    Past Medical History:   Diagnosis Date    Abnormal Pap smear     Anxiety     [facility]     Asthma  Hx LEEP (loop electrosurgical excision procedure), cervix, pregnancy 2008    Major depressive disorder, recurrent episode, unspecified 2011    Mental disorders of mother, antepartum 2011     Family History   Problem Relation Age of Onset    Breast Cancer Father      Past Surgical History:   Procedure Laterality Date    BREAST SURGERY      abscess removed from left breast     SECTION      LEEP       Social History     Socioeconomic History    Marital status:      Spouse name: Not on file    Number of children: Not on file    Years of education: Not on file    Highest education level: Not on file   Occupational History    Not on file   Social Needs    Financial resource strain: Not on file    Food insecurity     Worry: Not on file     Inability: Not on file    Transportation needs     Medical: Not on file     Non-medical: Not on file   Tobacco Use    Smoking status: Former Smoker    Smokeless tobacco: Never Used   Substance and Sexual Activity    Alcohol use: Yes     Frequency: Never     Drinks per session: 1 or 2     Binge frequency: Never     Comment: occassionaly    Drug use: No    Sexual activity: Yes     Partners: Male   Lifestyle    Physical activity     Days per week: Not on file     Minutes per session: Not on file    Stress: Not on file   Relationships    Social connections     Talks on phone: Not on file     Gets together: Not on file     Attends Latter day service: Not on file     Active member of club or organization: Not on file     Attends meetings of clubs or organizations: Not on file     Relationship status: Not on file    Intimate partner violence     Fear of current or ex partner: Not on file     Emotionally abused: Not on file     Physically abused: Not on file     Forced sexual activity: Not on file   Other Topics Concern    Not on file   Social History Narrative    Not on file       Vitals:    21 0935   BP: 92/60   Site: Left Upper Arm Position: Sitting   Pulse: 91   Resp: 16   Temp: 98.4 °F (36.9 °C)   TempSrc: Temporal   SpO2: 97%   Weight: 144 lb (65.3 kg)   Height: 5' (1.524 m)       EXAM:  Appears well. No acute distress. Oriented x3. Ambulates without difficulty. No antalgia or list.      Lumbar flexion: 45/60  Lumbar extension: 10/25  Right lateral flexion: 10/25  Left lateral flexion: 10/25  Endrange pain throughout    Reflexes are +2/5 and symmetrical at the Patella and Achilles. Manual muscle testing reveals: 5/5 strength to the LE indicator muscles. Sensation to light touch is WNL to the distal lower extremity dermatomes. Posterior tibial pulses 2/4 B/L. Valsalva's: Positive   Seated SLR's: Positive Right Lower Extremity  Conley's:  Positive for right lower extremity symptoms  Slump:  Negative Bilateral  Supine SLR:  Positive Right Lower Extremity    Prone Lying: Worse  Prone Lying in extension: Worsening    Midline pain: Positive at L4-S1  Taut and Tender fibers lumbar paraspinals B/L  Joint fixation at: L4-5      Cora was seen today for lower back pain. Diagnoses and all orders for this visit:    Segmental and somatic dysfunction of lumbar region    Lumbar degenerative disc disease        Treatment Plan:   I spoke with her regarding my exam findings and treatment options. Reviewed her x-rays with her today. Multilevel degenerative changes, mild scoliotic deformity as well. I recommended that we start a trial of conservative care today consisting flexion-based therapy, manipulation. .  I will plan on seeing her 1 times per week for 6 weeks, then reassess to determine response to care and future options. With consent, I did begin today. Consider MRI if she fails to respond or symptoms worsen.       Problem/Goals  Decrease pain and/or swelling and Increase ROM and/or flexibility    Today's Treatment Chris flexion distraction at L4, protocol and was performed today. Tolerated well. I want her starting some seated flexion stretches table top reaches at home, set of 10, 2-3 x per day. I spoke to her regarding posttreatment soreness. Should any arise, she  may use ice for 10-15 minutes, over-the-counter NSAIDs or topical gels. Seen By:  Nell Stephen DC    * This note was created using voice recognition software.   The note was reviewed, however grammatical errors may exist.

## 2021-03-08 ENCOUNTER — OFFICE VISIT (OUTPATIENT)
Dept: PRIMARY CARE CLINIC | Age: 41
End: 2021-03-08
Payer: COMMERCIAL

## 2021-03-08 ENCOUNTER — OFFICE VISIT (OUTPATIENT)
Dept: CHIROPRACTIC MEDICINE | Age: 41
End: 2021-03-08
Payer: COMMERCIAL

## 2021-03-08 VITALS
OXYGEN SATURATION: 98 % | HEART RATE: 82 BPM | RESPIRATION RATE: 14 BRPM | WEIGHT: 144 LBS | HEIGHT: 60 IN | BODY MASS INDEX: 28.27 KG/M2 | TEMPERATURE: 98 F

## 2021-03-08 VITALS
TEMPERATURE: 97.5 F | HEART RATE: 80 BPM | DIASTOLIC BLOOD PRESSURE: 66 MMHG | SYSTOLIC BLOOD PRESSURE: 108 MMHG | WEIGHT: 149 LBS | RESPIRATION RATE: 16 BRPM | OXYGEN SATURATION: 100 % | HEIGHT: 60 IN | BODY MASS INDEX: 29.25 KG/M2

## 2021-03-08 DIAGNOSIS — M99.01 SEGMENTAL AND SOMATIC DYSFUNCTION OF CERVICAL REGION: ICD-10-CM

## 2021-03-08 DIAGNOSIS — M54.41 CHRONIC RIGHT-SIDED LOW BACK PAIN WITH RIGHT-SIDED SCIATICA: Primary | ICD-10-CM

## 2021-03-08 DIAGNOSIS — E03.8 SUBCLINICAL HYPOTHYROIDISM: ICD-10-CM

## 2021-03-08 DIAGNOSIS — M99.02 SEGMENTAL AND SOMATIC DYSFUNCTION OF THORACIC REGION: ICD-10-CM

## 2021-03-08 DIAGNOSIS — Z72.9 LIFESTYLE PROBLEMS: ICD-10-CM

## 2021-03-08 DIAGNOSIS — M51.36 LUMBAR DEGENERATIVE DISC DISEASE: ICD-10-CM

## 2021-03-08 DIAGNOSIS — R79.9 ELEVATED BUN: ICD-10-CM

## 2021-03-08 DIAGNOSIS — L30.9 DERMATITIS: ICD-10-CM

## 2021-03-08 DIAGNOSIS — F33.1 MODERATE EPISODE OF RECURRENT MAJOR DEPRESSIVE DISORDER (HCC): ICD-10-CM

## 2021-03-08 DIAGNOSIS — R71.8 ELEVATED MCV: ICD-10-CM

## 2021-03-08 DIAGNOSIS — M54.04 PANNICULITIS AFFECTING REGIONS OF NECK AND BACK, THORACIC REGION: ICD-10-CM

## 2021-03-08 DIAGNOSIS — M99.03 SEGMENTAL AND SOMATIC DYSFUNCTION OF LUMBAR REGION: Primary | ICD-10-CM

## 2021-03-08 DIAGNOSIS — M54.2 CHRONIC NECK PAIN: ICD-10-CM

## 2021-03-08 DIAGNOSIS — G89.29 CHRONIC NECK PAIN: ICD-10-CM

## 2021-03-08 DIAGNOSIS — M79.7 FIBROMYALGIA: ICD-10-CM

## 2021-03-08 DIAGNOSIS — E55.9 VITAMIN D DEFICIENCY: ICD-10-CM

## 2021-03-08 DIAGNOSIS — G89.29 CHRONIC RIGHT-SIDED LOW BACK PAIN WITH RIGHT-SIDED SCIATICA: Primary | ICD-10-CM

## 2021-03-08 PROCEDURE — 98941 CHIROPRACT MANJ 3-4 REGIONS: CPT | Performed by: CHIROPRACTOR

## 2021-03-08 PROCEDURE — G8427 DOCREV CUR MEDS BY ELIG CLIN: HCPCS | Performed by: FAMILY MEDICINE

## 2021-03-08 PROCEDURE — 1036F TOBACCO NON-USER: CPT | Performed by: FAMILY MEDICINE

## 2021-03-08 PROCEDURE — G8484 FLU IMMUNIZE NO ADMIN: HCPCS | Performed by: FAMILY MEDICINE

## 2021-03-08 PROCEDURE — G8419 CALC BMI OUT NRM PARAM NOF/U: HCPCS | Performed by: FAMILY MEDICINE

## 2021-03-08 PROCEDURE — 99214 OFFICE O/P EST MOD 30 MIN: CPT | Performed by: FAMILY MEDICINE

## 2021-03-08 RX ORDER — ARIPIPRAZOLE 2 MG/1
1 TABLET ORAL NIGHTLY
COMMUNITY
Start: 2021-02-27 | End: 2022-05-25

## 2021-03-08 ASSESSMENT — ENCOUNTER SYMPTOMS
SHORTNESS OF BREATH: 0
WHEEZING: 0
VOMITING: 0
SORE THROAT: 0
DIARRHEA: 0
ABDOMINAL PAIN: 0
RHINORRHEA: 0
BACK PAIN: 1
CONSTIPATION: 0
NAUSEA: 0

## 2021-03-08 NOTE — PROGRESS NOTES
3/8/21  Marielena Ashton : 1980 Sex: female  Age: 41 y.o. Chief Complaint   Patient presents with    Lower Back Pain       HPI:   Pain is has worsened slightly. She states her low back always gets worse with her cycle. No new issues with the lower back however. She is noting some neck/upper back tightness and stiffness as well today. No new accidents or injuries. No changes otherwise. Current Outpatient Medications:     ARIPiprazole (ABILIFY) 2 MG tablet, Take 1 tablet by mouth nightly, Disp: , Rfl:     topiramate (TOPAMAX) 200 MG tablet, Take 1 tablet by mouth daily, Disp: , Rfl:     desvenlafaxine succinate (PRISTIQ) 50 MG TB24 extended release tablet, Take 1 tablet by mouth daily, Disp: , Rfl:     amphetamine-dextroamphetamine (ADDERALL XR) 25 MG extended release capsule, Take 1 capsule by mouth 2 times daily. , Disp: , Rfl:     traZODone (DESYREL) 150 MG tablet, Take 1 tablet by mouth daily, Disp: , Rfl:     ALPRAZolam (XANAX) 1 MG tablet, , Disp: , Rfl:     Exam:   Vitals:    21 0906   Pulse: 82   Resp: 14   Temp: 98 °F (36.7 °C)   SpO2: 98%       There is mild limitation of cervical active range of motion in all planes. Cervical compression and distraction are both negative. Maximum cervical rotatory compression testing is negative bilaterally. The she has trigger points, hypertonic tender fibers throughout the cervical and thoracic paraspinal muscles today. Trigger points right levator scapulae, left trapezius and bilateral rhomboids  There are hypertonic and tender fibers noted today in the cervical, thoracic and lumbar paraspinal muscles. Joint fixation is noted with motion screening at C6-7, T4-5, L4-5. Prone lying and prone lying in extension because of radiation of symptoms down both legs today. Also laying supine with her normal curvature cause some increased symptoms in both posterior thighs. Relieved when she bends her knees and flattened her low back. Cora was seen today for lower back pain. Diagnoses and all orders for this visit:    Segmental and somatic dysfunction of lumbar region    Lumbar degenerative disc disease    Segmental and somatic dysfunction of thoracic region    Panniculitis affecting regions of neck and back, thoracic region    Segmental and somatic dysfunction of cervical region    Chronic neck pain        Treatment Plan: Continued with Chris flexion distraction manipulation at L4 today, protocol 1. Diversified manipulation to listed thoracic and cervical segments. Tolerated well. We did discuss possibly get an MRI on her lumbar region after a trial of conservative care. This would depend on her response.   She follows with Dr. Maddie Rain later today      Seen By:  Pato Odonnell

## 2021-03-08 NOTE — PROGRESS NOTES
3/8/2021     Chief Complaint   Patient presents with    Results     follow up     HPI  Jimi Pena (:  1980) is a 41 y.o. female, here for evaluation of the following medical concerns:    Patient is a 75-year-old female with a past medical history subclinical hypothyroidism, chronic low back pain, dermatitis, depression/anxiety, fibromyalgia,  who presents today to f/u [facility] care apt. Labs: TSH 1.1, vitamin D 38, rheumatoid factor negative, sed rate 5, ELDA negative, TSH 1.2, total cholesterol 205, triglycerides 37, , LDL 82, CMP essentially within normal limits, CBC shows a mildly low white blood cell count, MCV elevated at 105. Hemoglobin on the lower side of normal. Anti-CCP negative.     Anxiety/Depression: Not under good control. No SI/HI. Patient is following with psychiatry regards this issue. Patient reports that they're actively changing her medications due to poor control. Patient is on several medications that are also used to help manage her migraines. Patient still has several migraines throughout the month. Patient does have aura with floaters and visual changes per her report.     Eczema: Dandruff, patches on her face. Started about a few months ago. Reports she has tried OTC therapies without relief. Patient had autoimmune work-up that was negative as noted above. Referral to Derm provided at last apt. Needs to schedule.      Excessive sweating from armpits: Ongoing since childhood. Pt to discuss w/ derm     Low Back Pain: Patient reports issues with this for years. Reports days when she can not walk due to pain down right leg and radicular symptoms. Patient reports pain along the right side with radiation down her right leg. No red flag symptoms. Reports abnormal MRI in the past. Has tried PT. Patient reports that she has scoliosis and possibly DDD in the lumbar spine. X-ray + for DDD. Referred to PT and Chiropractic care after last apt.  Discussed PMNR vs neurosurgery referral.      Ob/GYN: Patient has not seen an Kaela Gamble 55 in several years. Patient reports abnormal cramping in her lower pelvic region that is especially worse during her menstrual cycles. Patient will need follow-up with ObGyn for this issue. HM: Will review at next apt. Review of Systems   Constitutional: Negative for chills and fever. HENT: Negative for congestion, rhinorrhea and sore throat. Respiratory: Negative for shortness of breath and wheezing. Cardiovascular: Negative for chest pain and leg swelling. Gastrointestinal: Negative for abdominal pain, constipation, diarrhea, nausea and vomiting. Musculoskeletal: Positive for arthralgias and back pain. Skin: Positive for rash. Neurological: Positive for headaches. Negative for light-headedness. Psychiatric/Behavioral: The patient is nervous/anxious. Past Medical History:   Diagnosis Date    Abnormal Pap smear     Anxiety     [facility]     Asthma     Hx LEEP (loop electrosurgical excision procedure), cervix, pregnancy 2008    Major depressive disorder, recurrent episode, unspecified 8/5/2011    Mental disorders of mother, antepartum 8/5/2011       Prior to Visit Medications    Medication Sig Taking? Authorizing Provider   ARIPiprazole (ABILIFY) 2 MG tablet Take 1 tablet by mouth nightly Yes Historical Provider, MD   topiramate (TOPAMAX) 200 MG tablet Take 1 tablet by mouth daily Yes Historical Provider, MD   desvenlafaxine succinate (PRISTIQ) 50 MG TB24 extended release tablet Take 1 tablet by mouth daily Yes Historical Provider, MD   amphetamine-dextroamphetamine (ADDERALL XR) 25 MG extended release capsule Take 1 capsule by mouth 2 times daily.  Yes Historical Provider, MD   traZODone (DESYREL) 150 MG tablet Take 1 tablet by mouth daily Yes Historical Provider, MD   ALPRAZolam [employer]) 1 MG tablet  Yes Historical Provider, MD        No Known Allergies    Social History     Tobacco Use    Smoking status: Former Smoker    Smokeless tobacco: Never Used   Substance Use Topics    Alcohol use: Yes     Frequency: Never     Drinks per session: 1 or 2     Binge frequency: Never     Comment: occassionaly           Vitals:    03/08/21 0951   BP: 108/66   Pulse: 80   Resp: 16   Temp: 97.5 °F (36.4 °C)   TempSrc: Temporal   SpO2: 100%   Weight: 149 lb (67.6 kg)   Height: 5' (1.524 m)     Estimated body mass index is 29.1 kg/m² as calculated from the following:    Height as of this encounter: 5' (1.524 m). Weight as of this encounter: 149 lb (67.6 kg). Physical Exam  Constitutional:       Appearance: She is well-developed. HENT:      Head: Normocephalic. Eyes:      Conjunctiva/sclera: Conjunctivae normal.      Pupils: Pupils are equal, round, and reactive to light. Cardiovascular:      Rate and Rhythm: Normal rate and regular rhythm. Heart sounds: Normal heart sounds. No murmur. Pulmonary:      Effort: Pulmonary effort is normal.      Breath sounds: Normal breath sounds. No wheezing or rales. Abdominal:      General: Bowel sounds are normal.      Palpations: Abdomen is soft. Tenderness: There is no abdominal tenderness. Musculoskeletal:      Right lower leg: No edema. Left lower leg: No edema. Neurological:      Mental Status: She is alert. Comments: Cranial nerves grossly intact         ASSESSMENT/PLAN:  Cora was seen today for results. Diagnoses and all orders for this visit:    Chronic right-sided low back pain with right-sided sciatica  Patient has degenerative disc disease on x-ray imaging. Patient is following chiropractic care. Patient will also see physical therapy if needed. Consider referral to [facility] and/or neurosurgery if needed. Patient may complete at home physical therapy exercises. Dermatitis  Patient was referred to dermatology for this issue at her last point. Patient still needs to schedule. Autoimmune workup negative.     Moderate episode of recurrent major depressive disorder ([facility] [address]  Stable. No recent changes since her last office point. Following with psychiatry/psychology. No SI/HI. Fibromyalgia  Patient I concerns at her last office appointment for an autoimmune issue. Workup including rheumatoid factor and ELDA was negative. Lifestyle modifications reviewed and advise. Symptomatically treatment. Subclinical hypothyroidism  TSH and T4 appropriate. Continue to follow. Vitamin D deficiency  Decreased vitamin D level. Patient is to start 2000 international units with vitamin K2. Lifestyle problems  -     formerly [facility]    Elevated MCV  -     CBC WITH AUTO DIFFERENTIAL; Future  -     Vitamin B12 & Folate; Future  Specific etiology unknown. Mild low normal hemoglobin/hematocrit. Concerns for B12 or folate deficiency. Patient advised on B12 and folate supplementation. Repeat labs at next appointment. Elevated BUN  -     BASIC METABOLIC PANEL; Future  Patient may have been slightly dehydrated when she had her labs drawn. Patient advised to repeat her labs after fasting and consuming 2 glasses of water in the a.m. prior to her next appoint. Health maintenance will be reviewed further at her next office appointment when she has followed up with ObGyn. Return in about 3 months (around 6/8/2021). An [employer] was used to authenticate this note.     --Isabela Lopez MD on 3/8/21 at 8:19 AM EST

## 2021-03-23 ENCOUNTER — OFFICE VISIT (OUTPATIENT)
Dept: FAMILY MEDICINE CLINIC | Age: 41
End: 2021-03-23
Payer: COMMERCIAL

## 2021-03-23 VITALS
BODY MASS INDEX: 25.16 KG/M2 | WEIGHT: 151 LBS | OXYGEN SATURATION: 98 % | TEMPERATURE: 97.7 F | SYSTOLIC BLOOD PRESSURE: 114 MMHG | RESPIRATION RATE: 18 BRPM | DIASTOLIC BLOOD PRESSURE: 70 MMHG | HEART RATE: 89 BPM | HEIGHT: 65 IN

## 2021-03-23 DIAGNOSIS — J01.90 ACUTE NON-RECURRENT SINUSITIS, UNSPECIFIED LOCATION: Primary | ICD-10-CM

## 2021-03-23 DIAGNOSIS — H61.22 IMPACTED CERUMEN OF LEFT EAR: ICD-10-CM

## 2021-03-23 DIAGNOSIS — R09.82 POSTNASAL DRIP: ICD-10-CM

## 2021-03-23 DIAGNOSIS — H66.92 LEFT OTITIS MEDIA, UNSPECIFIED OTITIS MEDIA TYPE: ICD-10-CM

## 2021-03-23 PROCEDURE — 69210 REMOVE IMPACTED EAR WAX UNI: CPT | Performed by: PHYSICIAN ASSISTANT

## 2021-03-23 PROCEDURE — G8484 FLU IMMUNIZE NO ADMIN: HCPCS | Performed by: PHYSICIAN ASSISTANT

## 2021-03-23 PROCEDURE — 99213 OFFICE O/P EST LOW 20 MIN: CPT | Performed by: PHYSICIAN ASSISTANT

## 2021-03-23 PROCEDURE — G8427 DOCREV CUR MEDS BY ELIG CLIN: HCPCS | Performed by: PHYSICIAN ASSISTANT

## 2021-03-23 PROCEDURE — 1036F TOBACCO NON-USER: CPT | Performed by: PHYSICIAN ASSISTANT

## 2021-03-23 PROCEDURE — G8419 CALC BMI OUT NRM PARAM NOF/U: HCPCS | Performed by: PHYSICIAN ASSISTANT

## 2021-03-23 RX ORDER — METHYLPREDNISOLONE 4 MG/1
TABLET ORAL
Qty: 1 KIT | Refills: 0 | Status: SHIPPED
Start: 2021-03-23 | End: 2021-03-31 | Stop reason: ALTCHOICE

## 2021-03-23 RX ORDER — FLUCONAZOLE 150 MG/1
TABLET ORAL
Qty: 2 TABLET | Refills: 0 | Status: SHIPPED
Start: 2021-03-23 | End: 2021-03-31 | Stop reason: ALTCHOICE

## 2021-03-23 RX ORDER — CHLORPHENIRAMINE MALEATE 4 MG/1
4 TABLET ORAL EVERY 6 HOURS PRN
Qty: 20 TABLET | Refills: 0 | Status: SHIPPED
Start: 2021-03-23 | End: 2021-03-31 | Stop reason: ALTCHOICE

## 2021-03-23 RX ORDER — AMOXICILLIN AND CLAVULANATE POTASSIUM 875; 125 MG/1; MG/1
1 TABLET, FILM COATED ORAL 2 TIMES DAILY
Qty: 20 TABLET | Refills: 0 | Status: SHIPPED
Start: 2021-03-23 | End: 2021-03-31 | Stop reason: ALTCHOICE

## 2021-03-23 NOTE — PROGRESS NOTES
3/23/21  Balwinder Pozo : 1980 Sex: female  Age 41 y.o. Subjective:  Chief Complaint   Patient presents with    Sinus Problem     sore throat, left ear pain         HPI:   Balwinder Pozo , 41 y.o. female presents to [facility] care for evaluation of nasal congestion, rhinorrhea, sore throat and left ear pain. The patient states that primarily she is complaining of left ear pain. The patient does have some nasal congestion rhinorrhea that started over the weekend but she started with the left ear. The patient denies any drainage or discharge. The patient is not having any right ear pain. No chest pain, shortness of breath. The patient is eating and drinking normally. Not currently on any antibiotics. ROS:   Unless otherwise stated in this report the patient's positive and negative responses for review of systems for constitutional, eyes, ENT, cardiovascular, respiratory, gastrointestinal, neurological, , musculoskeletal, and integument systems and related systems to the presenting problem are either stated in the history of present illness or were not pertinent or were negative for the symptoms and/or complaints related to the presenting medical problem. Positives and pertinent negatives as per HPI. All others reviewed and are negative.       PMH:     Past Medical History:   Diagnosis Date    Abnormal Pap smear     Anxiety     [facility]     Asthma     Hx LEEP (loop electrosurgical excision procedure), cervix, pregnancy     Major depressive disorder, recurrent episode, unspecified 2011    Mental disorders of mother, antepartum 2011       Past Surgical History:   Procedure Laterality Date    BREAST SURGERY      abscess removed from left breast     SECTION      LEEP         Family History   Problem Relation Age of Onset    Breast Cancer Father        Medications:     Current Outpatient Medications:     ARIPiprazole (ABILIFY) 2 MG tablet, Take 1 tablet by mouth nightly, Disp: , Rfl:     topiramate (TOPAMAX) 200 MG tablet, Take 1 tablet by mouth daily, Disp: , Rfl:     desvenlafaxine succinate (PRISTIQ) 50 MG TB24 extended release tablet, Take 1 tablet by mouth daily, Disp: , Rfl:     amphetamine-dextroamphetamine (ADDERALL XR) 25 MG extended release capsule, Take 1 capsule by mouth 2 times daily. , Disp: , Rfl:     traZODone (DESYREL) 150 MG tablet, Take 1 tablet by mouth daily, Disp: , Rfl:     ALPRAZolam (XANAX) 1 MG tablet, , Disp: , Rfl:     Allergies:   No Known Allergies    Social History:     Social History     Tobacco Use    Smoking status: Former Smoker    Smokeless tobacco: Never Used   Substance Use Topics    Alcohol use: Yes     Frequency: Never     Drinks per session: 1 or 2     Binge frequency: Never     Comment: occassionaly    Drug use: No       Patient lives at home. Physical Exam:     Vitals:    03/23/21 1057   BP: 114/70   Pulse: 89   Resp: 18   Temp: 97.7 °F (36.5 °C)   SpO2: 98%   Weight: 151 lb (68.5 kg)   Height: 5' 5\" (1.651 m)       Exam:  Physical Exam  Nurse's notes and vital signs reviewed. The patient is not hypoxic. ? General: Alert, no acute distress, patient resting comfortably Patient is not toxic or lethargic. Skin: Warm, intact, no pallor noted. There is no evidence of rash at this time. Head: Normocephalic, atraumatic  Eye: Normal conjunctiva  Ears, Nose, Throat: Right tympanic membrane clear, left tympanic membrane erythematous and bulging with large portion of wax in the external canal. No drainage or discharge noted. No pre- or post-auricular tenderness, erythema, or swelling noted. No rhinorrhea or congestion noted. Posterior oropharynx shows no erythema, tonsillar hypertrophy, or exudate. the uvula is midline. No trismus or drooling is noted. Moist mucous membranes. Neck: No anterior/posterior lymphadenopathy noted. No erythema, no masses, no fluctuance or induration noted. No meningeal signs. Cardiovascular: Regular Rate and Rhythm  Respiratory: No acute distress, no rhonchi, wheezing or crackles noted. No stridor or retractions are noted. Neurological: A&O x4, normal speech  Psychiatric: Cooperative         Testing:           Medical Decision Making:     The patient has been seen and evaluated. The vital signs have been reviewed. The patient does not appear to be toxic or lethargic. The patient was able to have a large portion of the wax removed in the left external canal with ear curette. The patient tolerated this well. Does have evidence of a left otitis media. The patient will be treated with Augmentin, chlorphentermine, Diflucan and Medrol Dosepak. The patient was educated on the proper dosage of motrin and tylenol and the appropriate intervals of each. The patient is to increase fluid intake over the next several days. The patient is to use OTC decongestant as needed. The patient is to return to express care or go directly to the emergency department should any of the signs or symptoms worsen. The patient is to followup with primary care physician in 2-3 days for repeat evaluation. The patient has no other questions or concerns at this time the patient will be discharged home. Clinical Impression:   Manasa Bearden was seen today for sinus problem. Diagnoses and all orders for this visit:    Acute non-recurrent sinusitis, unspecified location    Postnasal drip    Left otitis media, unspecified otitis media type    Impacted cerumen of left ear  -     59365 - OR REMOVE IMPACTED EAR WAX    Other orders  -     amoxicillin-clavulanate (AUGMENTIN) 875-125 MG per tablet; Take 1 tablet by mouth 2 times daily for 10 days  -     methylPREDNISolone (MEDROL DOSEPACK) 4 MG tablet; Take by mouth.  -     chlorpheniramine (ALLER-CHLOR) 4 MG tablet; Take 1 tablet by mouth every 6 hours as needed for Allergies  -     fluconazole (DIFLUCAN) 150 MG tablet;  Take 1 now and then another at the end of the

## 2021-03-31 ENCOUNTER — OFFICE VISIT (OUTPATIENT)
Dept: FAMILY MEDICINE CLINIC | Age: 41
End: 2021-03-31
Payer: COMMERCIAL

## 2021-03-31 VITALS
TEMPERATURE: 97.3 F | OXYGEN SATURATION: 97 % | HEIGHT: 65 IN | SYSTOLIC BLOOD PRESSURE: 118 MMHG | BODY MASS INDEX: 25.33 KG/M2 | HEART RATE: 84 BPM | WEIGHT: 152 LBS | DIASTOLIC BLOOD PRESSURE: 74 MMHG | RESPIRATION RATE: 18 BRPM

## 2021-03-31 DIAGNOSIS — R07.0 PAIN IN THROAT: ICD-10-CM

## 2021-03-31 DIAGNOSIS — J01.90 ACUTE NON-RECURRENT SINUSITIS, UNSPECIFIED LOCATION: Primary | ICD-10-CM

## 2021-03-31 DIAGNOSIS — R09.81 NASAL CONGESTION: ICD-10-CM

## 2021-03-31 DIAGNOSIS — R09.82 POSTNASAL DRIP: ICD-10-CM

## 2021-03-31 DIAGNOSIS — R51.9 SINUS HEADACHE: ICD-10-CM

## 2021-03-31 PROCEDURE — 99214 OFFICE O/P EST MOD 30 MIN: CPT | Performed by: PHYSICIAN ASSISTANT

## 2021-03-31 PROCEDURE — 96372 THER/PROPH/DIAG INJ SC/IM: CPT | Performed by: PHYSICIAN ASSISTANT

## 2021-03-31 PROCEDURE — G8419 CALC BMI OUT NRM PARAM NOF/U: HCPCS | Performed by: PHYSICIAN ASSISTANT

## 2021-03-31 PROCEDURE — G8427 DOCREV CUR MEDS BY ELIG CLIN: HCPCS | Performed by: PHYSICIAN ASSISTANT

## 2021-03-31 PROCEDURE — G8484 FLU IMMUNIZE NO ADMIN: HCPCS | Performed by: PHYSICIAN ASSISTANT

## 2021-03-31 PROCEDURE — 1036F TOBACCO NON-USER: CPT | Performed by: PHYSICIAN ASSISTANT

## 2021-03-31 RX ORDER — LEVOFLOXACIN 500 MG/1
500 TABLET, FILM COATED ORAL DAILY
Qty: 10 TABLET | Refills: 0 | Status: SHIPPED | OUTPATIENT
Start: 2021-03-31 | End: 2021-04-10

## 2021-03-31 RX ORDER — PREDNISONE 20 MG/1
TABLET ORAL
Qty: 18 TABLET | Refills: 0 | Status: SHIPPED
Start: 2021-03-31 | End: 2021-04-20 | Stop reason: ALTCHOICE

## 2021-03-31 RX ORDER — METHYLPREDNISOLONE ACETATE 80 MG/ML
60 INJECTION, SUSPENSION INTRA-ARTICULAR; INTRALESIONAL; INTRAMUSCULAR; SOFT TISSUE ONCE
Status: COMPLETED | OUTPATIENT
Start: 2021-03-31 | End: 2021-03-31

## 2021-03-31 RX ORDER — FEXOFENADINE HCL AND PSEUDOEPHEDRINE HCI 180; 240 MG/1; MG/1
1 TABLET, EXTENDED RELEASE ORAL DAILY
Qty: 14 TABLET | Refills: 0 | Status: SHIPPED
Start: 2021-03-31 | End: 2021-04-20 | Stop reason: ALTCHOICE

## 2021-03-31 RX ORDER — FLUCONAZOLE 150 MG/1
TABLET ORAL
Qty: 2 TABLET | Refills: 0 | Status: SHIPPED
Start: 2021-03-31 | End: 2021-04-20 | Stop reason: ALTCHOICE

## 2021-03-31 RX ADMIN — METHYLPREDNISOLONE ACETATE 60 MG: 80 INJECTION, SUSPENSION INTRA-ARTICULAR; INTRALESIONAL; INTRAMUSCULAR; SOFT TISSUE at 15:10

## 2021-03-31 NOTE — PROGRESS NOTES
3/31/21  Kira Waite : 1980 Sex: female  Age 41 y.o. Subjective:  Chief Complaint   Patient presents with    Sinus Problem         HPI:   Kira Waite , 41 y.o. female presents to [facility] care for evaluation of sinus congestion, drainage. The patient was seen and evaluated on 3/23/2021. The patient was given antibiotics, steroids, decongestant without any significant improvement. The patient is still having quite a bit of congestion. She noted there was about a day when she was feeling better. The patient ultimately has really not noted any significant improvement. The patient is not any chest pain, shortness of breath, abdominal pain. No leg headedness or dizziness. The patient is not currently on any medications for the sinuses at this time. ROS:   Unless otherwise stated in this report the patient's positive and negative responses for review of systems for constitutional, eyes, ENT, cardiovascular, respiratory, gastrointestinal, neurological, , musculoskeletal, and integument systems and related systems to the presenting problem are either stated in the history of present illness or were not pertinent or were negative for the symptoms and/or complaints related to the presenting medical problem. Positives and pertinent negatives as per HPI. All others reviewed and are negative.       PMH:     Past Medical History:   Diagnosis Date    Abnormal Pap smear     Anxiety     [facility]     Asthma     Hx LEEP (loop electrosurgical excision procedure), cervix, pregnancy     Major depressive disorder, recurrent episode, unspecified 2011    Mental disorders of mother, antepartum 2011       Past Surgical History:   Procedure Laterality Date    BREAST SURGERY      abscess removed from left breast     SECTION      LEEP         Family History   Problem Relation Age of Onset    Breast Cancer Father        Medications:     Current Outpatient Medications:   levoFLOXacin (LEVAQUIN) 500 MG tablet, Take 1 tablet by mouth daily for 10 days, Disp: 10 tablet, Rfl: 0    fexofenadine-pseudoephedrine (ALLEGRA-D 24HR) 180-240 MG per extended release tablet, Take 1 tablet by mouth daily, Disp: 14 tablet, Rfl: 0    fluconazole (DIFLUCAN) 150 MG tablet, Take 1 now and then another at the end of the course of the antibiotic, Disp: 2 tablet, Rfl: 0    predniSONE (DELTASONE) 20 MG tablet, 3 tablets once daily for 3 days, 2 tablets once daily for 3 days, one tablet once daily for 3 days, Disp: 18 tablet, Rfl: 0    ARIPiprazole (ABILIFY) 2 MG tablet, Take 1 tablet by mouth nightly, Disp: , Rfl:     topiramate (TOPAMAX) 200 MG tablet, Take 1 tablet by mouth daily, Disp: , Rfl:     desvenlafaxine succinate (PRISTIQ) 50 MG TB24 extended release tablet, Take 1 tablet by mouth daily, Disp: , Rfl:     amphetamine-dextroamphetamine (ADDERALL XR) 25 MG extended release capsule, Take 1 capsule by mouth 2 times daily. , Disp: , Rfl:     traZODone (DESYREL) 150 MG tablet, Take 1 tablet by mouth daily, Disp: , Rfl:     ALPRAZolam (XANAX) 1 MG tablet, , Disp: , Rfl:     Allergies:   No Known Allergies    Social History:     Social History     Tobacco Use    Smoking status: Former Smoker    Smokeless tobacco: Never Used   Substance Use Topics    Alcohol use: Yes     Frequency: Never     Drinks per session: 1 or 2     Binge frequency: Never     Comment: occassionaly    Drug use: No       Patient lives at home. Physical Exam:     Vitals:    03/31/21 1457   BP: 118/74   Pulse: 84   Resp: 18   Temp: 97.3 °F (36.3 °C)   SpO2: 97%   Weight: 152 lb (68.9 kg)   Height: 5' 5\" (1.651 m)       Exam:  Physical Exam  Nurse's notes and vital signs reviewed. The patient is not hypoxic. ? General: Alert, no acute distress, patient resting comfortably Patient is not toxic or lethargic. Skin: Warm, intact, no pallor noted. There is no evidence of rash at this time.   Head: Normocephalic, atraumatic  Eye: Normal conjunctiva  Ears, Nose, Throat: Right tympanic membrane clear, left tympanic membrane clear. No drainage or discharge noted. No pre- or post-auricular tenderness, erythema, or swelling noted. Nasal congestion, rhinorrhea, no epistaxis  Posterior oropharynx shows erythema and cobblestoning but no signs of tonsillar hypertrophy, or exudate. the uvula is midline. No trismus or drooling is noted. Moist mucous membranes. Neck: No anterior/posterior lymphadenopathy noted. No erythema, no masses, no fluctuance or induration noted. No meningeal signs. Cardiovascular: Regular Rate and Rhythm  Respiratory: No acute distress, no rhonchi, wheezing or crackles noted. No stridor or retractions are noted. Neurological: A&O x4, normal speech  Psychiatric: Cooperative         Testing:           Medical Decision Making:     Vital signs reviewed    Past medical history reviewed. Allergies reviewed. Medications reviewed. Patient on arrival does not appear to be in any apparent distress or discomfort. The patient has been seen and evaluated. The patient does not appear to be toxic or lethargic. Previous chart and medications reviewed. The patient will be given intramuscular injection methylprednisone here. The patient will be started on Levaquin, Diflucan, and Allegra-D. The patient will also be giving larger tapering dose of prednisone. The patient was educated on the proper dosage of motrin and tylenol and the appropriate intervals of each. The patient is to increase fluid intake over the next several days. The patient is to use OTC decongestant as needed. The patient is to return to express care or go directly to the emergency department should any of the signs or symptoms worsen. The patient is to followup with primary care physician in 2-3 days for repeat evaluation. The patient has no other questions or concerns at this time the patient will be discharged home. Clinical Impression:   Raynard Goodpasture was seen today for sinus problem. Diagnoses and all orders for this visit:    Acute non-recurrent sinusitis, unspecified location    Sinus headache    Nasal congestion    Postnasal drip    Pain in throat    Other orders  -     methylPREDNISolone acetate (DEPO-MEDROL) injection 60 mg  -     levoFLOXacin (LEVAQUIN) 500 MG tablet; Take 1 tablet by mouth daily for 10 days  -     fexofenadine-pseudoephedrine (ALLEGRA-D 24HR) 180-240 MG per extended release tablet; Take 1 tablet by mouth daily  -     fluconazole (DIFLUCAN) 150 MG tablet; Take 1 now and then another at the end of the course of the antibiotic  -     predniSONE (DELTASONE) 20 MG tablet; 3 tablets once daily for 3 days, 2 tablets once daily for 3 days, one tablet once daily for 3 days        The patient is to call for any concerns or return if any of the signs or symptoms worsen. The patient is to follow-up with PCP in the next 2-3 days for repeat evaluation repeat assessment or go directly to the emergency department.      SIGNATURE: Lavern Monday EDWARD MARTIN

## 2021-04-20 ENCOUNTER — OFFICE VISIT (OUTPATIENT)
Dept: FAMILY MEDICINE CLINIC | Age: 41
End: 2021-04-20
Payer: COMMERCIAL

## 2021-04-20 VITALS
DIASTOLIC BLOOD PRESSURE: 78 MMHG | BODY MASS INDEX: 25.33 KG/M2 | HEART RATE: 65 BPM | HEIGHT: 65 IN | RESPIRATION RATE: 18 BRPM | OXYGEN SATURATION: 99 % | SYSTOLIC BLOOD PRESSURE: 120 MMHG | TEMPERATURE: 97.3 F | WEIGHT: 152 LBS

## 2021-04-20 DIAGNOSIS — M79.672 LEFT FOOT PAIN: ICD-10-CM

## 2021-04-20 DIAGNOSIS — S92.515A CLOSED NONDISPLACED FRACTURE OF PROXIMAL PHALANX OF LESSER TOE OF LEFT FOOT, INITIAL ENCOUNTER: Primary | ICD-10-CM

## 2021-04-20 PROCEDURE — 1036F TOBACCO NON-USER: CPT | Performed by: PHYSICIAN ASSISTANT

## 2021-04-20 PROCEDURE — G8419 CALC BMI OUT NRM PARAM NOF/U: HCPCS | Performed by: PHYSICIAN ASSISTANT

## 2021-04-20 PROCEDURE — G8427 DOCREV CUR MEDS BY ELIG CLIN: HCPCS | Performed by: PHYSICIAN ASSISTANT

## 2021-04-20 PROCEDURE — 99214 OFFICE O/P EST MOD 30 MIN: CPT | Performed by: PHYSICIAN ASSISTANT

## 2021-04-20 NOTE — PROGRESS NOTES
21  Autumn Herrera : 1980 Sex: female  Age 41 y.o. Subjective:  Chief Complaint   Patient presents with    Toe Pain     left foot          HPI:   Autumn Herrera , 41 y.o. female presents to express care for evaluation of left foot pain, little toe injury. The patient apparently was walking by the cats and kicked the base of the couch. The patient injured the left little toe. The patient has significant swelling and bruising noted. The patient is able to walk but was walking with a limp. The patient denies any pain to the midfoot or to the hindfoot. The patient is not having any pain to the ankle. No previous fractures to the left little toe      ROS:   Unless otherwise stated in this report the patient's positive and negative responses for review of systems for constitutional, eyes, ENT, cardiovascular, respiratory, gastrointestinal, neurological, , musculoskeletal, and integument systems and related systems to the presenting problem are either stated in the history of present illness or were not pertinent or were negative for the symptoms and/or complaints related to the presenting medical problem. Positives and pertinent negatives as per HPI. All others reviewed and are negative.       PMH:     Past Medical History:   Diagnosis Date    Abnormal Pap smear     Anxiety     [facility]     Asthma     Hx LEEP (loop electrosurgical excision procedure), cervix, pregnancy     Major depressive disorder, recurrent episode, unspecified 2011    Mental disorders of mother, antepartum 2011       Past Surgical History:   Procedure Laterality Date    BREAST SURGERY      abscess removed from left breast     SECTION      LEEP         Family History   Problem Relation Age of Onset    Breast Cancer Father        Medications:     Current Outpatient Medications:     ARIPiprazole (ABILIFY) 2 MG tablet, Take 1 tablet by mouth nightly, Disp: , Rfl:     topiramate (TOPAMAX) 200 MG tablet, Take 1 tablet by mouth daily, Disp: , Rfl:     desvenlafaxine succinate (PRISTIQ) 50 MG TB24 extended release tablet, Take 1 tablet by mouth daily, Disp: , Rfl:     amphetamine-dextroamphetamine (ADDERALL XR) 25 MG extended release capsule, Take 1 capsule by mouth 2 times daily. , Disp: , Rfl:     traZODone (DESYREL) 150 MG tablet, Take 1 tablet by mouth daily, Disp: , Rfl:     ALPRAZolam (XANAX) 1 MG tablet, , Disp: , Rfl:     Allergies:   No Known Allergies    Social History:     Social History     Tobacco Use    Smoking status: Former Smoker    Smokeless tobacco: Never Used   Substance Use Topics    Alcohol use: Yes     Frequency: Never     Drinks per session: 1 or 2     Binge frequency: Never     Comment: occassionaly    Drug use: No       Patient lives at home. Physical Exam:     Vitals:    04/20/21 1048   BP: 120/78   Pulse: 65   Resp: 18   Temp: 97.3 °F (36.3 °C)   SpO2: 99%   Weight: 152 lb (68.9 kg)   Height: 5' 5\" (1.651 m)       Exam:  Physical Exam  Vital signs reviewed and nurse's notes. The patient is not hypoxic. General: Alert, no acute distress, patient resting comfortably   Skin: warm, intact, no pallor noted   Head: Normocephalic, atraumatic   Eye: Normal conjunctiva   Respiratory: No acute distress   Musculoskeletal: There is no obvious deformity noted to the left little toe or to the left foot. The patient does have notable swelling, purple ecchymosis. There is no erythema, warmth, lymphangitic streaking. The patient is not having any ankle pain or heel pain. The patient is not having any calf pain. The patient is able to ambulate. Pulses intact the DP/PT 2+. Normal sensation. Neurological: alert and orient x4, normal sensory and motor observed. Psychiatric: Cooperative      Testing:     No results found. Formal radiology report pending      Medical Decision Making:     Vital signs reviewed    Past medical history reviewed. Allergies reviewed. Medications reviewed. Patient on arrival does not appear to be in any apparent distress or discomfort. The patient had x-rays obtained in the office today and formal radiology report is pending at this time. I personally reviewed the x-ray images and did not see evidence of a oblique fracture of the proximal phalanx of the left little toe. I discussed this with the patient. The patient had ian taping and a postop shoe applied. We will have the patient follow-up with podiatry. I did discuss and reviewed the x-rays with Dr. So Gambino will have the patient follow-up with Dr. So Gambino    We did discuss the potential of occult fracture with the patient and the need for followup. The patient understands the need for follow-up and repeat evaluation. The patient was educated on RICE therapy, nsaids, and tylenol. The patient is to return if any of the signs or symptoms worsen. The patient is to follow-up with PCP in the next 2-3 days for repeat evaluation repeat assessment or go directly to the emergency department. Clinical Impression:   Yanni Abbott was seen today for toe pain. Diagnoses and all orders for this visit:    Closed nondisplaced fracture of proximal phalanx of lesser toe of left foot, initial encounter  -     [id number] DPM, Podiatry, [place]    Left foot pain  -     XR FOOT LEFT (MIN 3 VIEWS); Future        The patient is to call for any concerns or return if any of the signs or symptoms worsen. The patient is to follow-up with PCP in the next 2-3 days for repeat evaluation repeat assessment or go directly to the emergency department.      SIGNATURE: Gilberto Mason III, PA-C

## 2021-04-22 ENCOUNTER — OFFICE VISIT (OUTPATIENT)
Dept: PODIATRY | Age: 41
End: 2021-04-22
Payer: COMMERCIAL

## 2021-04-22 VITALS — SYSTOLIC BLOOD PRESSURE: 122 MMHG | DIASTOLIC BLOOD PRESSURE: 82 MMHG | TEMPERATURE: 97.2 F

## 2021-04-22 DIAGNOSIS — S92.515A CLOSED NONDISPLACED FRACTURE OF PROXIMAL PHALANX OF LESSER TOE OF LEFT FOOT, INITIAL ENCOUNTER: Primary | ICD-10-CM

## 2021-04-22 PROCEDURE — G8419 CALC BMI OUT NRM PARAM NOF/U: HCPCS | Performed by: PODIATRIST

## 2021-04-22 PROCEDURE — 1036F TOBACCO NON-USER: CPT | Performed by: PODIATRIST

## 2021-04-22 PROCEDURE — G8427 DOCREV CUR MEDS BY ELIG CLIN: HCPCS | Performed by: PODIATRIST

## 2021-04-22 PROCEDURE — 99203 OFFICE O/P NEW LOW 30 MIN: CPT | Performed by: PODIATRIST

## 2021-04-22 RX ORDER — DICLOFENAC POTASSIUM 50 MG/1
50 TABLET, FILM COATED ORAL 2 TIMES DAILY
Qty: 90 TABLET | Refills: 0 | Status: SHIPPED
Start: 2021-04-22 | End: 2022-08-24 | Stop reason: ALTCHOICE

## 2021-04-22 NOTE — PROGRESS NOTES
[facility] PODIAT[facility]  [address] [address]  Dept: [phone]  Dept Fax: [phone]    NEW PATIENT PROGRESS NOTE  Date of patient's visit: 4/22/2021  Patient's Name:  Sommer Hunter YOB: 1980            Patient Care Team:  Pablo Rodriguez MD as PCP - General (Family Medicine)  Pablo Rodriguez MD as PCP - [facility] Empaneled Provider        Chief Complaint   Patient presents with    Foot Injury    Foot Pain    Established New Doctor     referred by Lisy Hernandez on Sunday night she contused her left foot on a couch saw Lisy Hernandez on Tuesday had xrays and was given Post op shoe       Foot Injury   The incident occurred 3 to 5 days ago. The incident occurred at home. The pain is present in the left toes. The pain is at a severity of 6/10. The pain is moderate. Associated symptoms include an inability to bear weight. Foot Pain   Associated symptoms include an inability to bear weight. HPI:   Sommer Hunter is a 41 y.o. female who presents to the office today complaining of fractured left foot. This new patient is seen today at referral from [facility]. Patient fractured her fifth digit Sunday at home patient has been in a postop shoe feeling having some pain mostly during ambulation some swelling. Christa Frank No Known Allergies    Past Medical History:   Diagnosis Date    Abnormal Pap smear     Anxiety     [facility]     Asthma     Hx LEEP (loop electrosurgical excision procedure), cervix, pregnancy 2008    Major depressive disorder, recurrent episode, unspecified 8/5/2011    Mental disorders of mother, antepartum 8/5/2011       Prior to Admission medications    Medication Sig Start Date End Date Taking?  Authorizing Provider   diclofenac (CATAFLAM) 50 MG tablet Take 1 tablet by mouth 2 times daily 4/22/21 6/21/21 Yes Anais Briscoe DPM   ARIPiprazole (ABILIFY) 2 MG tablet Take 1 tablet by mouth nightly 2/27/21  Yes Historical

## 2021-05-11 ENCOUNTER — OFFICE VISIT (OUTPATIENT)
Dept: PODIATRY | Age: 41
End: 2021-05-11
Payer: COMMERCIAL

## 2021-05-11 VITALS — TEMPERATURE: 97.5 F | DIASTOLIC BLOOD PRESSURE: 62 MMHG | SYSTOLIC BLOOD PRESSURE: 122 MMHG

## 2021-05-11 DIAGNOSIS — S92.902D CLOSED FRACTURE OF LEFT FOOT WITH ROUTINE HEALING, SUBSEQUENT ENCOUNTER: Primary | ICD-10-CM

## 2021-05-11 PROCEDURE — G8419 CALC BMI OUT NRM PARAM NOF/U: HCPCS | Performed by: PODIATRIST

## 2021-05-11 PROCEDURE — 1036F TOBACCO NON-USER: CPT | Performed by: PODIATRIST

## 2021-05-11 PROCEDURE — G8427 DOCREV CUR MEDS BY ELIG CLIN: HCPCS | Performed by: PODIATRIST

## 2021-05-11 PROCEDURE — 99213 OFFICE O/P EST LOW 20 MIN: CPT | Performed by: PODIATRIST

## 2021-05-11 NOTE — PROGRESS NOTES
21  Melanee Nageotte : 1980 Sex: female  Age: 41 y.o. Patient was referred by Miroslava Hinojosa MD    Chief Complaint   Patient presents with    Foot Injury     left foot fx has been in cam walker since        SUBJECTIVE today for follow-up of a fracture to the proximal phalanx fifth digit left foot patient has been in a cam walker for 2 weeks postop shoe for 1 week patient is feeling better. HPI  Review of Systems  Const: Denies constitutional symptoms  Musculo: Denies symptoms other than stated above  Skin: Denies symptoms other than stated above       Current Outpatient Medications:     diclofenac (CATAFLAM) 50 MG tablet, Take 1 tablet by mouth 2 times daily, Disp: 90 tablet, Rfl: 0    ARIPiprazole (ABILIFY) 2 MG tablet, Take 1 tablet by mouth nightly, Disp: , Rfl:     topiramate (TOPAMAX) 200 MG tablet, Take 1 tablet by mouth daily, Disp: , Rfl:     desvenlafaxine succinate (PRISTIQ) 50 MG TB24 extended release tablet, Take 1 tablet by mouth daily, Disp: , Rfl:     amphetamine-dextroamphetamine (ADDERALL XR) 25 MG extended release capsule, Take 1 capsule by mouth 2 times daily. , Disp: , Rfl:     traZODone (DESYREL) 150 MG tablet, Take 1 tablet by mouth daily, Disp: , Rfl:     ALPRAZolam (XANAX) 1 MG tablet, , Disp: , Rfl:   No Known Allergies    Past Medical History:   Diagnosis Date    Abnormal Pap smear     Anxiety     [facility]     Asthma     Hx LEEP (loop electrosurgical excision procedure), cervix, pregnancy     Major depressive disorder, recurrent episode, unspecified 2011    Mental disorders of mother, antepartum 2011     Past Surgical History:   Procedure Laterality Date    BREAST SURGERY      abscess removed from left breast     SECTION      LEEP       Family History   Problem Relation Age of Onset    Breast Cancer Father      Social History     Socioeconomic History    Marital status:      Spouse name: Not on file    Number of children: Not on file    Years of education: Not on file    Highest education level: Not on file   Occupational History    Not on file   Social Needs    Financial resource strain: Not on file    Food insecurity     Worry: Not on file     Inability: Not on file    Transportation needs     Medical: Not on file     Non-medical: Not on file   Tobacco Use    Smoking status: Former Smoker    Smokeless tobacco: Never Used   Substance and Sexual Activity    Alcohol use: Yes     Frequency: Never     Drinks per session: 1 or 2     Binge frequency: Never     Comment: occassionaly    Drug use: No    Sexual activity: Yes     Partners: Male   Lifestyle    Physical activity     Days per week: Not on file     Minutes per session: Not on file    Stress: Not on file   Relationships    Social connections     Talks on phone: Not on file     Gets together: Not on file     Attends [employer] service: Not on file     Active member of club or organization: Not on file     Attends meetings of clubs or organizations: Not on file     Relationship status: Not on file    Intimate partner violence     Fear of current or ex partner: Not on file     Emotionally abused: Not on file     Physically abused: Not on file     Forced sexual activity: Not on file   Other Topics Concern    Not on file   Social History Narrative    Not on file       Vitals:    05/11/21 0838   BP: 122/62   Temp: 97.5 °F (36.4 °C)   TempSrc: Temporal   Weight: Comment: cam walker       Focused Lower Extremity Physical Exam:  Vitals:    05/11/21 0838   BP: 122/62   Temp: 97.5 °F (36.4 °C)        Foot Exam     Ortho Exam    Vascular: pulses  dp  pt  palapble  Capillary Refill Time:   Hair growth  Skin:    Edema:    Neurologic:      Musculoskeletal/ Orthopedic examination: Fifth digit left foot there is mild ecchymosis noted to the dorsal aspect swelling noted to the fifth digit left foot.   There is minimal pain with palpation to the fifth digit left foot new x-rays were taken showing a healing proximal phalanx. NAIL   Web space   Derm:          Assessment and Plan: They were again to take the patient out of the cam walker and place her in a postop shoe for 3 weeks. Continue reduced activities. No follow-ups on file. Seen By:  Will Fabian DPM      Document was created using voice recognition software. Note was reviewed, however may contain grammatical errors.

## 2021-10-07 ENCOUNTER — OFFICE VISIT (OUTPATIENT)
Dept: FAMILY MEDICINE CLINIC | Age: 41
End: 2021-10-07
Payer: COMMERCIAL

## 2021-10-07 VITALS
WEIGHT: 170 LBS | BODY MASS INDEX: 28.32 KG/M2 | TEMPERATURE: 97.8 F | HEART RATE: 89 BPM | HEIGHT: 65 IN | OXYGEN SATURATION: 97 % | RESPIRATION RATE: 16 BRPM | DIASTOLIC BLOOD PRESSURE: 74 MMHG | SYSTOLIC BLOOD PRESSURE: 108 MMHG

## 2021-10-07 DIAGNOSIS — M79.7 FIBROMYALGIA: ICD-10-CM

## 2021-10-07 DIAGNOSIS — R63.5 WEIGHT GAIN: ICD-10-CM

## 2021-10-07 DIAGNOSIS — H01.00B BLEPHARITIS OF UPPER AND LOWER EYELIDS OF BOTH EYES, UNSPECIFIED TYPE: ICD-10-CM

## 2021-10-07 DIAGNOSIS — H01.00A BLEPHARITIS OF UPPER AND LOWER EYELIDS OF BOTH EYES, UNSPECIFIED TYPE: ICD-10-CM

## 2021-10-07 DIAGNOSIS — G56.03 BILATERAL CARPAL TUNNEL SYNDROME: ICD-10-CM

## 2021-10-07 DIAGNOSIS — I73.00 RAYNAUD'S PHENOMENON WITHOUT GANGRENE: Primary | ICD-10-CM

## 2021-10-07 PROCEDURE — G8419 CALC BMI OUT NRM PARAM NOF/U: HCPCS | Performed by: FAMILY MEDICINE

## 2021-10-07 PROCEDURE — G8484 FLU IMMUNIZE NO ADMIN: HCPCS | Performed by: FAMILY MEDICINE

## 2021-10-07 PROCEDURE — 1036F TOBACCO NON-USER: CPT | Performed by: FAMILY MEDICINE

## 2021-10-07 PROCEDURE — 99214 OFFICE O/P EST MOD 30 MIN: CPT | Performed by: FAMILY MEDICINE

## 2021-10-07 PROCEDURE — G8427 DOCREV CUR MEDS BY ELIG CLIN: HCPCS | Performed by: FAMILY MEDICINE

## 2021-10-07 RX ORDER — ERYTHROMYCIN 5 MG/G
1 OINTMENT OPHTHALMIC EVERY 8 HOURS
Qty: 3.5 G | Refills: 2 | Status: SHIPPED | OUTPATIENT
Start: 2021-10-07 | End: 2021-10-17

## 2021-10-07 SDOH — ECONOMIC STABILITY: FOOD INSECURITY: WITHIN THE PAST 12 MONTHS, YOU WORRIED THAT YOUR FOOD WOULD RUN OUT BEFORE YOU GOT MONEY TO BUY MORE.: NEVER TRUE

## 2021-10-07 SDOH — ECONOMIC STABILITY: FOOD INSECURITY: WITHIN THE PAST 12 MONTHS, THE FOOD YOU BOUGHT JUST DIDN'T LAST AND YOU DIDN'T HAVE MONEY TO GET MORE.: NEVER TRUE

## 2021-10-07 ASSESSMENT — ENCOUNTER SYMPTOMS
WHEEZING: 0
VOMITING: 0
RHINORRHEA: 0
SORE THROAT: 0
ABDOMINAL PAIN: 0
NAUSEA: 0
COLOR CHANGE: 1
SHORTNESS OF BREATH: 0

## 2021-10-07 ASSESSMENT — SOCIAL DETERMINANTS OF HEALTH (SDOH): HOW HARD IS IT FOR YOU TO PAY FOR THE VERY BASICS LIKE FOOD, HOUSING, MEDICAL CARE, AND HEATING?: NOT HARD AT ALL

## 2021-10-07 NOTE — PROGRESS NOTES
10/7/2021     Chief Complaint   Patient presents with    Skin Discoloration     toes turning blue and purple    Eczema     on eyes    Numbness     tips of fingers     JALEESA Castillo (:  1980) is a 41 y.o. female, here for evaluation of the following medical concerns:    Patient is a 22-year-old female with a past medical history subclinical hypothyroidism, chronic low back pain, dermatitis, depression/anxiety, fibromyalgia,  who presents today for an acute office apt. Patient reports that he toes have started to turn purple. Not bothersome to her. Patient reports that she has family with Raynaud's syndrome. Reports multiple different colors to her toes including blue, purple, white, red. Patient reports that she does not regularly wear socks in the summer and usually wears primarily flip-flops. Bilateral wrist pain: Patient reports wrist pain with numbness and tingling into her fingers bilaterally. Patient reports that this started several weeks ago. Random episodes of numbness and tingling. Has not tried anything for such. Weight increase. Reports unintentional weight increase. Patient has not been doing anything in particular to try to maintain or lose weight. Flakiness to eyelids: Patient reports that this has been worsening over the last several weeks. Patient in the past was referred to dermatology but she has yet to establish. Review of Systems   Constitutional: Positive for unexpected weight change. Negative for chills and fever. HENT: Negative for congestion, rhinorrhea and sore throat. Respiratory: Negative for shortness of breath and wheezing. Cardiovascular: Negative for chest pain and leg swelling. Gastrointestinal: Negative for abdominal pain, nausea and vomiting. Musculoskeletal: Positive for arthralgias. Skin: Positive for color change and rash. Neurological: Positive for numbness. Negative for light-headedness and headaches.        Past Medical History:   Diagnosis Date    Abnormal Pap smear     Anxiety     [facility]     Asthma     Hx LEEP (loop electrosurgical excision procedure), cervix, pregnancy 2008    Major depressive disorder, recurrent episode, unspecified 8/5/2011    Mental disorders of mother, antepartum 8/5/2011       Prior to Visit Medications    Medication Sig Taking? Authorizing Provider   erythromycin (ROMYCIN) 5 MG/GM ophthalmic ointment Place 1 cm into both eyes every 8 hours for 10 days Yes Malcom Odonnell MD   ARIPiprazole (ABILIFY) 2 MG tablet Take 1 tablet by mouth nightly Yes Historical Provider, MD   topiramate (TOPAMAX) 200 MG tablet Take 1 tablet by mouth daily Yes Historical Provider, MD   desvenlafaxine succinate (PRISTIQ) 50 MG TB24 extended release tablet Take 1 tablet by mouth daily Yes Historical Provider, MD   amphetamine-dextroamphetamine (ADDERALL XR) 25 MG extended release capsule Take 1 capsule by mouth 2 times daily. Yes Historical Provider, MD   traZODone (DESYREL) 150 MG tablet Take 1 tablet by mouth daily Yes Historical Provider, MD   ALPRAZolam Cindia Muss) 1 MG tablet  Yes Historical Provider, MD   diclofenac (CATAFLAM) 50 MG tablet Take 1 tablet by mouth 2 times daily  Neeraj Davies DPM        No Known Allergies    Social History     Tobacco Use    Smoking status: Former Smoker    Smokeless tobacco: Never Used   Substance Use Topics    Alcohol use: Yes     Comment: occassionaly           Vitals:    10/07/21 0957   BP: 108/74   Pulse: 89   Resp: 16   Temp: 97.8 °F (36.6 °C)   TempSrc: Temporal   SpO2: 97%   Weight: 170 lb (77.1 kg)   Height: 5' 5\" (1.651 m)     Estimated body mass index is 28.29 kg/m² as calculated from the following:    Height as of this encounter: 5' 5\" (1.651 m). Weight as of this encounter: 170 lb (77.1 kg). Physical Exam  Constitutional:       Appearance: She is well-developed. HENT:      Head: Normocephalic.    Eyes:      Extraocular Movements: Extraocular movements intact. Conjunctiva/sclera: Conjunctivae normal.      Comments: Flakiness to the patient's eyelids bilaterally. Conjunctive are normal.   Cardiovascular:      Rate and Rhythm: Normal rate and regular rhythm. Heart sounds: Normal heart sounds. No murmur heard. Pulmonary:      Effort: Pulmonary effort is normal.      Breath sounds: Normal breath sounds. No wheezing or rales. Musculoskeletal:      Right lower leg: No edema. Left lower leg: No edema. Comments: Phalen and Tinel's positive bilaterally. Feet:      Comments: Bluish discoloration of the patient's toes bilaterally. Vascular pulses are 1-2+ bilaterally. No pain or tenderness on exam.  Patient reports normal sensation. Skin:     Capillary Refill: Capillary refill takes less than 2 seconds. Neurological:      General: No focal deficit present. Mental Status: She is alert. Comments: Cranial nerves grossly intact   Psychiatric:         Mood and Affect: Mood normal.         Judgment: Judgment normal.         ASSESSMENT/PLAN:  Cora was seen today for skin discoloration, eczema and numbness. Diagnoses and all orders for this visit:    Raynaud's phenomenon without gangrene  -     CBC Auto Differential; Future  -     Comprehensive Metabolic Panel; Future  -     ELDA; Future  -     C-Reactive Protein; Future  -     Rheumatoid Factor; Future  -     TSH; Future  -     Urinalysis; Future  New. Worsening. Rule out secondary causes. No signs or concerns for gangrene at this time. Patient advised with any new or worsening symptoms she should proceed to the emergency department. Will consider calcium channel blocker or referral to vascular surgery in the near future if desired. Blepharitis of upper and lower eyelids of both eyes, unspecified type  -     erythromycin (ROMYCIN) 5 MG/GM ophthalmic ointment; Place 1 cm into both eyes every 8 hours for 10 days  New. Worsening.   Patient to apply antibiotic to her eyelids twice to 3 times daily. If patient does not have improvement using such she may follow-up with dermatology and request referral for such. Bilateral carpal tunnel syndrome  -     EMG; Future  New. Worsening. Behavioral and conservative management reviewed today. Check testing. Consider referral to orthopedics. Fibromyalgia  Lab work-up as noted above. Consider referral to physical therapy and/or pain management if necessary. Weight gain  Labs as noted above. Return in about 2 weeks (around 10/21/2021) for Follow-up Appointment From Today's Visit. An [employer] was used to authenticate this note.     --Chavez Sylvester MD on 10/7/21 at 10:08 AM EDT

## 2021-10-11 DIAGNOSIS — N30.00 ACUTE CYSTITIS WITHOUT HEMATURIA: Primary | ICD-10-CM

## 2021-10-11 RX ORDER — SULFAMETHOXAZOLE AND TRIMETHOPRIM 800; 160 MG/1; MG/1
1 TABLET ORAL 2 TIMES DAILY
Qty: 10 TABLET | Refills: 0 | Status: SHIPPED | OUTPATIENT
Start: 2021-10-11 | End: 2021-10-16

## 2021-10-25 ENCOUNTER — OFFICE VISIT (OUTPATIENT)
Dept: FAMILY MEDICINE CLINIC | Age: 41
End: 2021-10-25
Payer: COMMERCIAL

## 2021-10-25 VITALS
BODY MASS INDEX: 28.96 KG/M2 | SYSTOLIC BLOOD PRESSURE: 118 MMHG | DIASTOLIC BLOOD PRESSURE: 62 MMHG | WEIGHT: 174 LBS | TEMPERATURE: 97.3 F

## 2021-10-25 DIAGNOSIS — M54.50 LOW BACK PAIN, UNSPECIFIED BACK PAIN LATERALITY, UNSPECIFIED CHRONICITY, UNSPECIFIED WHETHER SCIATICA PRESENT: ICD-10-CM

## 2021-10-25 DIAGNOSIS — U09.9 POST-COVID SYNDROME: Primary | ICD-10-CM

## 2021-10-25 LAB
BILIRUBIN, POC: NORMAL
BLOOD URINE, POC: NORMAL
CLARITY, POC: CLEAR
COLOR, POC: YELLOW
CONTROL: NORMAL
GLUCOSE URINE, POC: NORMAL
KETONES, POC: NORMAL
LEUKOCYTE EST, POC: NORMAL
NITRITE, POC: NORMAL
PH, POC: 6.5
PREGNANCY TEST URINE, POC: NORMAL
PROTEIN, POC: NORMAL
SPECIFIC GRAVITY, POC: 1.02
UROBILINOGEN, POC: NORMAL

## 2021-10-25 PROCEDURE — G8484 FLU IMMUNIZE NO ADMIN: HCPCS | Performed by: PHYSICIAN ASSISTANT

## 2021-10-25 PROCEDURE — G8427 DOCREV CUR MEDS BY ELIG CLIN: HCPCS | Performed by: PHYSICIAN ASSISTANT

## 2021-10-25 PROCEDURE — 1036F TOBACCO NON-USER: CPT | Performed by: PHYSICIAN ASSISTANT

## 2021-10-25 PROCEDURE — G8419 CALC BMI OUT NRM PARAM NOF/U: HCPCS | Performed by: PHYSICIAN ASSISTANT

## 2021-10-25 PROCEDURE — 81025 URINE PREGNANCY TEST: CPT | Performed by: PHYSICIAN ASSISTANT

## 2021-10-25 PROCEDURE — 81002 URINALYSIS NONAUTO W/O SCOPE: CPT | Performed by: PHYSICIAN ASSISTANT

## 2021-10-25 PROCEDURE — 99214 OFFICE O/P EST MOD 30 MIN: CPT | Performed by: PHYSICIAN ASSISTANT

## 2021-10-25 RX ORDER — PREDNISONE 20 MG/1
20 TABLET ORAL 2 TIMES DAILY
Qty: 10 TABLET | Refills: 0 | Status: SHIPPED | OUTPATIENT
Start: 2021-10-25 | End: 2021-10-30

## 2021-10-25 RX ORDER — DEXTROMETHORPHAN HYDROBROMIDE AND PROMETHAZINE HYDROCHLORIDE 15; 6.25 MG/5ML; MG/5ML
5 SYRUP ORAL EVERY 6 HOURS PRN
Qty: 120 ML | Refills: 0 | Status: SHIPPED
Start: 2021-10-25 | End: 2021-12-08

## 2021-10-26 NOTE — PROGRESS NOTES
her father. Allergies: Patient has no known allergies. Physical Exam         VS:  /62   Temp 97.3 °F (36.3 °C)   Wt 174 lb (78.9 kg)   BMI 28.96 kg/m²    Oxygen Saturation Interpretation: Normal.    Constitutional:  Alert, development consistent with age. NAD. Head:  NC/NT. Airway patent. Mouth: Posterior pharynx with mild erythema and clear postnasal drip. No tonsillar hypertrophy or exudate. Neck:  Normal ROM. Supple. No anterior cervical adenopathy noted. Lungs: CTAB without wheezes, rales, or rhonchi. Cough is harsh and dry. Patient with multiple coughing fits on exam.  She is breathing comfortably without any signs of respiratory distress noted. CV:  Regular rate and rhythm, normal heart sounds, without pathological murmurs, ectopy, gallops, or rubs. Back: Mild TTP noted over the right thoracic and lumbar paravertebral musculature. No midline tenderness noted. No step-off or gross deformity noted. No CVA tenderness bilaterally. Range of motion is physiologic. Neurovascular function is grossly intact. Skin:  Normal turgor. Warm, dry, without visible rash. Lymphatic: No lymphangitis or adenopathy noted. Neurological:  Oriented. Motor functions intact. Lab / Imaging Results   (All laboratory and radiology results have been personally reviewed by myself)  Labs:  Results for orders placed or performed in visit on 10/25/21   POCT Urinalysis no Micro   Result Value Ref Range    Color, UA yellow     Clarity, UA clear     Glucose, UA POC neg     Bilirubin, UA neg     Ketones, UA neg     Spec Grav, UA 1.025     Blood, UA POC trace lysed     pH, UA 6.5     Protein, UA POC neg     Urobilinogen, UA 0.2eu     Leukocytes, UA trace     Nitrite, UA neg    POCT urine pregnancy   Result Value Ref Range    Preg Test, Ur neg     Control         Imaging: All Radiology results interpreted by Radiologist unless otherwise noted.       Assessment / Plan     Impression(s):  Cora was seen today for cough, back pain and shortness of breath. Diagnoses and all orders for this visit:    Post-COVID syndrome  -     predniSONE (DELTASONE) 20 MG tablet; Take 1 tablet by mouth 2 times daily for 5 days  -     promethazine-dextromethorphan (PROMETHAZINE-DM) 6.25-15 MG/5ML syrup; Take 5 mLs by mouth every 6 hours as needed for Cough  -     XR CHEST STANDARD (2 VW); Future    Low back pain, unspecified back pain laterality, unspecified chronicity, unspecified whether sciatica present  -     POCT Urinalysis no Micro  -     Culture, Urine; Future  -     POCT urine pregnancy      Disposition:  Disposition: Discharge to home. Urinalysis was obtained in office given patient history of low back pain. This came back within normal limits aside from trace leukocytes and trace hematuria. This is likely contaminant, however urine C&S pending, will call with results once available. Urine pregnancy is negative in office today. Symptoms are most consistent with post COVID-19 syndrome. Order given for chest x-ray which was performed in our office today and came back within normal limits. Prescriptions written for a prednisone burst and as needed Promethazine DM cough syrup, side effects discussed. Continue to increase fluids and rest.  Additional symptomatic relief discussed including Tylenol prn pain/fever. Schedule virtual f/u with PCP in 7-10 days if symptoms persist. ED sooner if symptoms worsen or change. ED immediately with high or refractory fever, progressive SOB, dyspnea, CP, calf pain/swelling, shaking chills, vomiting, abdominal pain, lethargy, flank pain, or decreased urinary output. Pt verbalizes understanding and is in agreement with plan of care. All questions answered. Nano Hou PA-C    **This report was transcribed using voice recognition software. Every effort was made to ensure accuracy; however, inadvertent computerized transcription errors may be present.

## 2021-10-27 ENCOUNTER — TELEPHONE (OUTPATIENT)
Dept: PRIMARY CARE CLINIC | Age: 41
End: 2021-10-27

## 2021-10-27 NOTE — TELEPHONE ENCOUNTER
Patient calling stating she saw eJse Gibbs in [facility] and stated she is not getting any better and is asking if an abx could be called in. She stated she is now having ear pain on top of her other sx. Please advise.

## 2021-10-28 DIAGNOSIS — U09.9 POST-COVID SYNDROME: Primary | ICD-10-CM

## 2021-10-28 LAB — URINE CULTURE, ROUTINE: NORMAL

## 2021-10-28 RX ORDER — AZITHROMYCIN 250 MG/1
TABLET, FILM COATED ORAL
Qty: 6 TABLET | Refills: 0 | Status: SHIPPED
Start: 2021-10-28 | End: 2021-12-08

## 2021-12-08 ENCOUNTER — OFFICE VISIT (OUTPATIENT)
Dept: FAMILY MEDICINE CLINIC | Age: 41
End: 2021-12-08
Payer: COMMERCIAL

## 2021-12-08 VITALS
SYSTOLIC BLOOD PRESSURE: 114 MMHG | WEIGHT: 175 LBS | HEART RATE: 107 BPM | OXYGEN SATURATION: 100 % | TEMPERATURE: 97.6 F | DIASTOLIC BLOOD PRESSURE: 62 MMHG | BODY MASS INDEX: 29.12 KG/M2

## 2021-12-08 DIAGNOSIS — R07.0 PAIN IN THROAT: ICD-10-CM

## 2021-12-08 DIAGNOSIS — R05.9 COUGH: ICD-10-CM

## 2021-12-08 DIAGNOSIS — J06.9 ACUTE UPPER RESPIRATORY INFECTION, UNSPECIFIED: Primary | ICD-10-CM

## 2021-12-08 DIAGNOSIS — U09.9 POST-COVID SYNDROME: ICD-10-CM

## 2021-12-08 LAB — S PYO AG THROAT QL: NORMAL

## 2021-12-08 PROCEDURE — G8419 CALC BMI OUT NRM PARAM NOF/U: HCPCS | Performed by: PHYSICIAN ASSISTANT

## 2021-12-08 PROCEDURE — 1036F TOBACCO NON-USER: CPT | Performed by: PHYSICIAN ASSISTANT

## 2021-12-08 PROCEDURE — G8484 FLU IMMUNIZE NO ADMIN: HCPCS | Performed by: PHYSICIAN ASSISTANT

## 2021-12-08 PROCEDURE — 87880 STREP A ASSAY W/OPTIC: CPT | Performed by: PHYSICIAN ASSISTANT

## 2021-12-08 PROCEDURE — G8427 DOCREV CUR MEDS BY ELIG CLIN: HCPCS | Performed by: PHYSICIAN ASSISTANT

## 2021-12-08 PROCEDURE — 99213 OFFICE O/P EST LOW 20 MIN: CPT | Performed by: PHYSICIAN ASSISTANT

## 2021-12-08 RX ORDER — BENZONATATE 100 MG/1
100 CAPSULE ORAL 3 TIMES DAILY PRN
Qty: 21 CAPSULE | Refills: 0 | Status: SHIPPED | OUTPATIENT
Start: 2021-12-08 | End: 2021-12-15

## 2021-12-08 RX ORDER — CHLORPHENIRAMINE MALEATE 4 MG/1
4 TABLET ORAL EVERY 6 HOURS PRN
Qty: 20 TABLET | Refills: 0 | Status: SHIPPED
Start: 2021-12-08 | End: 2022-03-07

## 2021-12-08 RX ORDER — PREDNISONE 10 MG/1
TABLET ORAL
Qty: 18 TABLET | Refills: 0 | Status: SHIPPED
Start: 2021-12-08 | End: 2022-03-07

## 2021-12-08 RX ORDER — ALBUTEROL SULFATE 90 UG/1
2 AEROSOL, METERED RESPIRATORY (INHALATION) 4 TIMES DAILY PRN
Qty: 18 G | Refills: 0 | Status: SHIPPED
Start: 2021-12-08 | End: 2022-05-04

## 2021-12-08 RX ORDER — FLUCONAZOLE 150 MG/1
TABLET ORAL
Qty: 2 TABLET | Refills: 0 | Status: SHIPPED
Start: 2021-12-08 | End: 2022-03-07

## 2021-12-08 RX ORDER — DOXYCYCLINE HYCLATE 100 MG
100 TABLET ORAL 2 TIMES DAILY
Qty: 20 TABLET | Refills: 0 | Status: SHIPPED | OUTPATIENT
Start: 2021-12-08 | End: 2021-12-18

## 2021-12-08 NOTE — PROGRESS NOTES
21  Latoya Youssef : 1980 Sex: female  Age 35 y.o. Subjective:  Chief Complaint   Patient presents with    Cough     ongoing since covid in oct w fatigue    Pharyngitis     2w          HPI:   Latoya Youssef , 35 y.o. female presents to [facility] care for evaluation of cough, congestion, sore throat    HPI  40-year-old female presents to [facility] for evaluation cough, congestion, sore throat. The patient is had the symptoms ongoing for the better part of the last couple of months. The patient was recently diagnosed with Covid back in October. Patient did have a chest x-ray that was negative. She still coughing still congested. She has a sore throat. Here with daughter who has developed some upper respiratory symptoms over the last couple of days. The patient is concerned because mother is awaiting a bilateral lung transplant. ROS:   Unless otherwise stated in this report the patient's positive and negative responses for review of systems for constitutional, eyes, ENT, cardiovascular, respiratory, gastrointestinal, neurological, , musculoskeletal, and integument systems and related systems to the presenting problem are either stated in the history of present illness or were not pertinent or were negative for the symptoms and/or complaints related to the presenting medical problem. Positives and pertinent negatives as per HPI. All others reviewed and are negative.       PMH:     Past Medical History:   Diagnosis Date    Abnormal Pap smear     Anxiety     [facility]     Asthma     Hx LEEP (loop electrosurgical excision procedure), cervix, pregnancy     Major depressive disorder, recurrent episode, unspecified 2011    Mental disorders of mother, antepartum 2011       Past Surgical History:   Procedure Laterality Date    BREAST SURGERY      abscess removed from left breast     SECTION      LEEP         Family History   Problem Relation Age of Onset    Breast Cancer Father        Medications:     Current Outpatient Medications:     doxycycline hyclate (VIBRA-TABS) 100 MG tablet, Take 1 tablet by mouth 2 times daily for 10 days, Disp: 20 tablet, Rfl: 0    predniSONE (DELTASONE) 10 MG tablet, 3 tabs once daily for 3 days, 2 tabs once daily for 3 days, 1 tab once daily for 3 days, Disp: 18 tablet, Rfl: 0    chlorpheniramine (ALLER-CHLOR) 4 MG tablet, Take 1 tablet by mouth every 6 hours as needed for Allergies, Disp: 20 tablet, Rfl: 0    benzonatate (TESSALON) 100 MG capsule, Take 1 capsule by mouth 3 times daily as needed for Cough, Disp: 21 capsule, Rfl: 0    albuterol sulfate  (90 Base) MCG/ACT inhaler, Inhale 2 puffs into the lungs 4 times daily as needed for Wheezing, Disp: 18 g, Rfl: 0    fluconazole (DIFLUCAN) 150 MG tablet, Take 1 now and then another at the end of the course of the antibiotic, Disp: 2 tablet, Rfl: 0    diclofenac (CATAFLAM) 50 MG tablet, Take 1 tablet by mouth 2 times daily, Disp: 90 tablet, Rfl: 0    ARIPiprazole (ABILIFY) 2 MG tablet, Take 1 tablet by mouth nightly, Disp: , Rfl:     topiramate (TOPAMAX) 200 MG tablet, Take 1 tablet by mouth daily, Disp: , Rfl:     desvenlafaxine succinate (PRISTIQ) 50 MG TB24 extended release tablet, Take 1 tablet by mouth daily, Disp: , Rfl:     amphetamine-dextroamphetamine (ADDERALL XR) 25 MG extended release capsule, Take 1 capsule by mouth 2 times daily. , Disp: , Rfl:     traZODone (DESYREL) 150 MG tablet, Take 1 tablet by mouth daily, Disp: , Rfl:     ALPRAZolam (XANAX) 1 MG tablet, , Disp: , Rfl:     Allergies:   No Known Allergies    Social History:     Social History     Tobacco Use    Smoking status: Former Smoker    Smokeless tobacco: Never Used   Substance Use Topics    Alcohol use: Yes     Comment: occassionaly    Drug use: No       Patient lives at home.     Physical Exam:     Vitals:    12/08/21 0949   BP: 114/62   Pulse: 107   Temp: 97.6 °F (36.4 °C)   SpO2: 100%   Weight: 175 lb (79.4 kg)       Exam:  Physical Exam  Nurse's notes and vital signs reviewed. The patient is not hypoxic. ? General: Alert, no acute distress, patient resting comfortably Patient is not toxic or lethargic. Skin: Warm, intact, no pallor noted. There is no evidence of rash at this time. Head: Normocephalic, atraumatic  Eye: Normal conjunctiva  Ears, Nose, Throat: Right tympanic membrane clear, left tympanic membrane clear. No drainage or discharge noted. No pre- or post-auricular tenderness, erythema, or swelling noted. Minimal congestion  Posterior oropharynx shows no erythema, tonsillar hypertrophy, or exudate. the uvula is midline. No trismus or drooling is noted. Moist mucous membranes. Neck: No anterior/posterior lymphadenopathy noted. No erythema, no masses, no fluctuance or induration noted. No meningeal signs. Cardiovascular: Regular Rate and Rhythm  Respiratory: No acute distress, diminished lung sounds bilaterally but no rhonchi, wheezing or crackles noted. No stridor or retractions are noted. Neurological: A&O x4, normal speech  Psychiatric: Cooperative         Testing:     Results for orders placed or performed in visit on 12/08/21   POCT rapid strep A   Result Value Ref Range    Strep A Ag None Detected None Detected           Medical Decision Making:     Vital signs reviewed    Past medical history reviewed. Allergies reviewed. Medications reviewed. Patient on arrival does not appear to be in any apparent distress or discomfort. The patient has been seen and evaluated. The patient does not appear to be toxic or lethargic. The patient does not appear to be toxic or lethargic. The patient had chest x-ray review of previously that did not show any evidence of acute process. We will obtain a rapid strep. .    Rapid strep was negative.   We will do a PCR send out Covid test.    The patient will be treated with albuterol, Tessalon Perles, chlorphentermine, doxycycline, and prednisone. The patient will also be given a prescription for Diflucan. Close follow-up with PCP. If symptoms or not improving would suggest CT scan. The patient is to return to express care or go directly to the emergency department should any of the signs or symptoms worsen. The patient is to followup with primary care physician in 2-3 days for repeat evaluation. The patient has no other questions or concerns at this time the patient will be discharged home. Clinical Impression:   Lashanda Wilson was seen today for cough and pharyngitis. Diagnoses and all orders for this visit:    Acute upper respiratory infection, unspecified    Pain in throat  -     POCT rapid strep A  -     COVID-19 Ambulatory; Future    Post-COVID syndrome    Cough    Other orders  -     doxycycline hyclate (VIBRA-TABS) 100 MG tablet; Take 1 tablet by mouth 2 times daily for 10 days  -     predniSONE (DELTASONE) 10 MG tablet; 3 tabs once daily for 3 days, 2 tabs once daily for 3 days, 1 tab once daily for 3 days  -     chlorpheniramine (ALLER-CHLOR) 4 MG tablet; Take 1 tablet by mouth every 6 hours as needed for Allergies  -     benzonatate (TESSALON) 100 MG capsule; Take 1 capsule by mouth 3 times daily as needed for Cough  -     albuterol sulfate  (90 Base) MCG/ACT inhaler; Inhale 2 puffs into the lungs 4 times daily as needed for Wheezing  -     fluconazole (DIFLUCAN) 150 MG tablet; Take 1 now and then another at the end of the course of the antibiotic        The patient is to call for any concerns or return if any of the signs or symptoms worsen. The patient is to follow-up with PCP in the next 2-3 days for repeat evaluation repeat assessment or go directly to the emergency department.      SIGNATURE: Piedad Garcia III, PA-C

## 2021-12-10 LAB
SARS-COV-2: NOT DETECTED
SOURCE: NORMAL

## 2022-03-07 ENCOUNTER — OFFICE VISIT (OUTPATIENT)
Dept: FAMILY MEDICINE CLINIC | Age: 42
End: 2022-03-07
Payer: COMMERCIAL

## 2022-03-07 VITALS
DIASTOLIC BLOOD PRESSURE: 66 MMHG | TEMPERATURE: 97.1 F | BODY MASS INDEX: 30.12 KG/M2 | OXYGEN SATURATION: 100 % | WEIGHT: 181 LBS | HEART RATE: 103 BPM | SYSTOLIC BLOOD PRESSURE: 124 MMHG

## 2022-03-07 DIAGNOSIS — M25.50 ARTHRALGIA, UNSPECIFIED JOINT: ICD-10-CM

## 2022-03-07 DIAGNOSIS — M25.551 RIGHT HIP PAIN: ICD-10-CM

## 2022-03-07 DIAGNOSIS — M79.641 RIGHT HAND PAIN: ICD-10-CM

## 2022-03-07 DIAGNOSIS — M25.561 ACUTE PAIN OF RIGHT KNEE: ICD-10-CM

## 2022-03-07 DIAGNOSIS — M25.511 ACUTE PAIN OF RIGHT SHOULDER: Primary | ICD-10-CM

## 2022-03-07 DIAGNOSIS — M25.531 RIGHT WRIST PAIN: ICD-10-CM

## 2022-03-07 DIAGNOSIS — M25.571 ACUTE RIGHT ANKLE PAIN: ICD-10-CM

## 2022-03-07 LAB
ALBUMIN SERPL-MCNC: 4.4 G/DL (ref 3.5–5.2)
ALP BLD-CCNC: 46 U/L (ref 35–104)
ALT SERPL-CCNC: 18 U/L (ref 0–32)
ANION GAP SERPL CALCULATED.3IONS-SCNC: 18 MMOL/L (ref 7–16)
AST SERPL-CCNC: 26 U/L (ref 0–31)
BASOPHILS ABSOLUTE: 0.06 E9/L (ref 0–0.2)
BASOPHILS RELATIVE PERCENT: 1.4 % (ref 0–2)
BILIRUB SERPL-MCNC: 0.3 MG/DL (ref 0–1.2)
BUN BLDV-MCNC: 18 MG/DL (ref 6–20)
C-REACTIVE PROTEIN: 0.3 MG/DL (ref 0–0.4)
CALCIUM SERPL-MCNC: 9.2 MG/DL (ref 8.6–10.2)
CHLORIDE BLD-SCNC: 105 MMOL/L (ref 98–107)
CO2: 19 MMOL/L (ref 22–29)
CREAT SERPL-MCNC: 0.6 MG/DL (ref 0.5–1)
EOSINOPHILS ABSOLUTE: 0.26 E9/L (ref 0.05–0.5)
EOSINOPHILS RELATIVE PERCENT: 6.1 % (ref 0–6)
GFR AFRICAN AMERICAN: >60
GFR NON-AFRICAN AMERICAN: >60 ML/MIN/1.73
GLUCOSE BLD-MCNC: 100 MG/DL (ref 74–99)
HCT VFR BLD CALC: 39 % (ref 34–48)
HEMOGLOBIN: 12.4 G/DL (ref 11.5–15.5)
IMMATURE GRANULOCYTES #: 0.01 E9/L
IMMATURE GRANULOCYTES %: 0.2 % (ref 0–5)
LYMPHOCYTES ABSOLUTE: 1.88 E9/L (ref 1.5–4)
LYMPHOCYTES RELATIVE PERCENT: 44.3 % (ref 20–42)
MCH RBC QN AUTO: 32.5 PG (ref 26–35)
MCHC RBC AUTO-ENTMCNC: 31.8 % (ref 32–34.5)
MCV RBC AUTO: 102.1 FL (ref 80–99.9)
MONOCYTES ABSOLUTE: 0.42 E9/L (ref 0.1–0.95)
MONOCYTES RELATIVE PERCENT: 9.9 % (ref 2–12)
NEUTROPHILS ABSOLUTE: 1.61 E9/L (ref 1.8–7.3)
NEUTROPHILS RELATIVE PERCENT: 38.1 % (ref 43–80)
PDW BLD-RTO: 12.9 FL (ref 11.5–15)
PLATELET # BLD: 231 E9/L (ref 130–450)
PMV BLD AUTO: 10.9 FL (ref 7–12)
POTASSIUM SERPL-SCNC: 4 MMOL/L (ref 3.5–5)
RBC # BLD: 3.82 E12/L (ref 3.5–5.5)
SEDIMENTATION RATE, ERYTHROCYTE: 10 MM/HR (ref 0–20)
SODIUM BLD-SCNC: 142 MMOL/L (ref 132–146)
TOTAL PROTEIN: 7.6 G/DL (ref 6.4–8.3)
WBC # BLD: 4.2 E9/L (ref 4.5–11.5)

## 2022-03-07 PROCEDURE — G8417 CALC BMI ABV UP PARAM F/U: HCPCS | Performed by: PHYSICIAN ASSISTANT

## 2022-03-07 PROCEDURE — 1036F TOBACCO NON-USER: CPT | Performed by: PHYSICIAN ASSISTANT

## 2022-03-07 PROCEDURE — G8484 FLU IMMUNIZE NO ADMIN: HCPCS | Performed by: PHYSICIAN ASSISTANT

## 2022-03-07 PROCEDURE — G8427 DOCREV CUR MEDS BY ELIG CLIN: HCPCS | Performed by: PHYSICIAN ASSISTANT

## 2022-03-07 PROCEDURE — 99214 OFFICE O/P EST MOD 30 MIN: CPT | Performed by: PHYSICIAN ASSISTANT

## 2022-03-07 RX ORDER — METRONIDAZOLE 500 MG/1
500 TABLET ORAL 2 TIMES DAILY
Qty: 14 TABLET | Refills: 0 | Status: SHIPPED | OUTPATIENT
Start: 2022-03-07 | End: 2022-03-14

## 2022-03-07 NOTE — PROGRESS NOTES
3/7/22  Refugio Paulino : 1980 Sex: female  Age 35 y.o. Subjective:  Chief Complaint   Patient presents with    Shoulder Pain     all R sided x1m/no injury/scheduled w pcp next week for express fu     Wrist Pain    Hand Pain    Hip Pain    Ankle Pain    Back Pain         HPI:   Refugio Paulino , 35 y.o. female presents to express care for evaluation of right-sided pain, possible bacterial vaginal infection    HPI  69-year-old female presents to [facility] for evaluation of diffuse pain to the multiple joints of the right upper extremity and the right lower extremity. The patient has had these pains and aches to the right shoulder, the right wrist, the right hand, the right hip, ankle and posterior hip area ongoing for 1-2months now. The patient states that there has not been any traumatic injury. No falls. The patient not really have any swelling. The patient states that is only right-sided from the upper to the lower extremity. The patient did not have any weakness of the area. The patient is not having any fevers or chills. Incidentally the patient also notes that she believes that she may have a bacterial vaginosis infection. The patient is not been able to follow-up with a new OB/GYN since hers retired. ROS:   Unless otherwise stated in this report the patient's positive and negative responses for review of systems for constitutional, eyes, ENT, cardiovascular, respiratory, gastrointestinal, neurological, , musculoskeletal, and integument systems and related systems to the presenting problem are either stated in the history of present illness or were not pertinent or were negative for the symptoms and/or complaints related to the presenting medical problem. Positives and pertinent negatives as per HPI. All others reviewed and are negative.       PMH:     Past Medical History:   Diagnosis Date    Abnormal Pap smear     Anxiety     [facility]     Asthma     Hx LEEP (loop electrosurgical excision procedure), cervix, pregnancy     Major depressive disorder, recurrent episode, unspecified 2011    Mental disorders of mother, antepartum 2011       Past Surgical History:   Procedure Laterality Date    BREAST SURGERY      abscess removed from left breast     SECTION      LEEP         Family History   Problem Relation Age of Onset    Breast Cancer Father        Medications:     Current Outpatient Medications:     metroNIDAZOLE (FLAGYL) 500 MG tablet, Take 1 tablet by mouth in the morning and at bedtime for 7 days, Disp: 14 tablet, Rfl: 0    albuterol sulfate  (90 Base) MCG/ACT inhaler, Inhale 2 puffs into the lungs 4 times daily as needed for Wheezing, Disp: 18 g, Rfl: 0    diclofenac (CATAFLAM) 50 MG tablet, Take 1 tablet by mouth 2 times daily, Disp: 90 tablet, Rfl: 0    ARIPiprazole (ABILIFY) 2 MG tablet, Take 1 tablet by mouth nightly, Disp: , Rfl:     topiramate (TOPAMAX) 200 MG tablet, Take 1 tablet by mouth daily, Disp: , Rfl:     desvenlafaxine succinate (PRISTIQ) 50 MG TB24 extended release tablet, Take 1 tablet by mouth daily, Disp: , Rfl:     amphetamine-dextroamphetamine (ADDERALL XR) 25 MG extended release capsule, Take 1 capsule by mouth 2 times daily. , Disp: , Rfl:     traZODone (DESYREL) 150 MG tablet, Take 1 tablet by mouth daily, Disp: , Rfl:     ALPRAZolam (XANAX) 1 MG tablet, , Disp: , Rfl:     Allergies:   No Known Allergies    Social History:     Social History     Tobacco Use    Smoking status: Former Smoker    Smokeless tobacco: Never Used   Substance Use Topics    Alcohol use: Yes     Comment: occassionaly    Drug use: No       Patient lives at home. Physical Exam:     Vitals:    22 1051   BP: 124/66   Pulse: 103   Temp: 97.1 °F (36.2 °C)   SpO2: 100%   Weight: 181 lb (82.1 kg)       Exam:  Physical Exam  Nurses note and vital signs reviewed and patient is not hypoxic.     General: The patient appears well and in no apparent distress. Patient is resting comfortably on cart. Skin: Warm, dry, no pallor noted. There is no rash noted. Head: Normocephalic, atraumatic. Eye: Normal conjunctiva  Ears, Nose, Mouth, and Throat: Oral mucosa is moist  Cardiovascular: Regular Rate and Rhythm  Respiratory: Patient is in no distress, no accessory muscle use, lungs are clear to auscultation, no wheezing, crackles or rhonchi  Back: Non-tender, no CVA tenderness bilaterally to percussion. GI: Normal bowel sounds, no tenderness to palpation, no masses appreciated. No rebound, guarding, or rigidity noted. Musculoskeletal: There is no obvious deformity noted to the right upper extremity or the right lower extremity. There is no obvious deformity noted to the left upper extremity or the left lower extremity. The patient has no significant edema noted. The patient has some diffuse tenderness noted to the right shoulder, the right wrist and hand. The patient normal equal  strength. The patient had pulses intact. The patient had no pelvic instability. Had some diffuse tenderness noted to the right lateral hip and posterior hip area. The patient had some generalized tenderness noted to the right knee into the right ankle. The patient had no significant calf tenderness. There is no calf swelling. Pulses intact the DP/PT 2+. Neurological: A&O x4, normal speech      Testing:     XR SHOULDER RIGHT (MIN 2 VIEWS)    Result Date: 3/7/2022  EXAMINATION: 3 XRAY VIEWS OF THE RIGHT WRIST; FOUR XRAY VIEWS OF THE RIGHT KNEE; THREE XRAY VIEWS OF THE RIGHT HAND; THREE XRAY VIEWS OF THE RIGHT ANKLE; THREE XRAY VIEWS OF THE RIGHT SHOULDER; ONE XRAY VIEW OF THE PELVIS AND TWO XRAY VIEWS RIGHT HIP 3/7/2022 10:19 am COMPARISON: None.  HISTORY: ORDERING SYSTEM PROVIDED HISTORY: Right wrist pain TECHNOLOGIST PROVIDED HISTORY: Reason for exam:->right wrist pain; ORDERING SYSTEM PROVIDED HISTORY: Acute pain of right knee TECHNOLOGIST PROVIDED HISTORY: Reason for exam:->pain in right knee; ORDERING SYSTEM PROVIDED HISTORY: Right hand pain TECHNOLOGIST PROVIDED HISTORY: Reason for exam:->right hand pain; ORDERING SYSTEM PROVIDED HISTORY: Acute right ankle pain TECHNOLOGIST PROVIDED HISTORY: Reason for exam:->right ankle pain; ORDERING SYSTEM PROVIDED HISTORY: Acute pain of right shoulder TECHNOLOGIST PROVIDED HISTORY: Reason for exam:->shoulder pain; ORDERING SYSTEM PROVIDED HISTORY: Right hip pain TECHNOLOGIST PROVIDED HISTORY: Reason for exam:->right hip pain FINDINGS: Right shoulder: No fracture or dislocation. Normal soft tissues. No significant arthropathy. Right wrist and hand: No fracture or dislocation. No significant arthropathy. Normal soft tissues. Right hip and pelvis: No fracture or dislocation. No significant arthropathy. Normal soft tissues. Right knee: Mild patellar spurring. No fracture or dislocation. Normal soft tissues. Right ankle: No mortise widening. No fracture or dislocation. Normal soft tissues. Mild spurring at the right patella. Unremarkable right shoulder, wrist, hand and right ankle. XR WRIST RIGHT (MIN 3 VIEWS)    Result Date: 3/7/2022  EXAMINATION: 3 XRAY VIEWS OF THE RIGHT WRIST; FOUR XRAY VIEWS OF THE RIGHT KNEE; THREE XRAY VIEWS OF THE RIGHT HAND; THREE XRAY VIEWS OF THE RIGHT ANKLE; THREE XRAY VIEWS OF THE RIGHT SHOULDER; ONE XRAY VIEW OF THE PELVIS AND TWO XRAY VIEWS RIGHT HIP 3/7/2022 10:19 am COMPARISON: None.  HISTORY: ORDERING SYSTEM PROVIDED HISTORY: Right wrist pain TECHNOLOGIST PROVIDED HISTORY: Reason for exam:->right wrist pain; ORDERING SYSTEM PROVIDED HISTORY: Acute pain of right knee TECHNOLOGIST PROVIDED HISTORY: Reason for exam:->pain in right knee; ORDERING SYSTEM PROVIDED HISTORY: Right hand pain TECHNOLOGIST PROVIDED HISTORY: Reason for exam:->right hand pain; ORDERING SYSTEM PROVIDED HISTORY: Acute right ankle pain TECHNOLOGIST PROVIDED HISTORY: Reason for exam:->right ankle pain; ORDERING SYSTEM PROVIDED HISTORY: Acute pain of right shoulder TECHNOLOGIST PROVIDED HISTORY: Reason for exam:->shoulder pain; ORDERING SYSTEM PROVIDED HISTORY: Right hip pain TECHNOLOGIST PROVIDED HISTORY: Reason for exam:->right hip pain FINDINGS: Right shoulder: No fracture or dislocation. Normal soft tissues. No significant arthropathy. Right wrist and hand: No fracture or dislocation. No significant arthropathy. Normal soft tissues. Right hip and pelvis: No fracture or dislocation. No significant arthropathy. Normal soft tissues. Right knee: Mild patellar spurring. No fracture or dislocation. Normal soft tissues. Right ankle: No mortise widening. No fracture or dislocation. Normal soft tissues. Mild spurring at the right patella. Unremarkable right shoulder, wrist, hand and right ankle. XR HAND RIGHT (MIN 3 VIEWS)    Result Date: 3/7/2022  EXAMINATION: 3 XRAY VIEWS OF THE RIGHT WRIST; FOUR XRAY VIEWS OF THE RIGHT KNEE; THREE XRAY VIEWS OF THE RIGHT HAND; THREE XRAY VIEWS OF THE RIGHT ANKLE; THREE XRAY VIEWS OF THE RIGHT SHOULDER; ONE XRAY VIEW OF THE PELVIS AND TWO XRAY VIEWS RIGHT HIP 3/7/2022 10:19 am COMPARISON: None.  HISTORY: ORDERING SYSTEM PROVIDED HISTORY: Right wrist pain TECHNOLOGIST PROVIDED HISTORY: Reason for exam:->right wrist pain; ORDERING SYSTEM PROVIDED HISTORY: Acute pain of right knee TECHNOLOGIST PROVIDED HISTORY: Reason for exam:->pain in right knee; ORDERING SYSTEM PROVIDED HISTORY: Right hand pain TECHNOLOGIST PROVIDED HISTORY: Reason for exam:->right hand pain; ORDERING SYSTEM PROVIDED HISTORY: Acute right ankle pain TECHNOLOGIST PROVIDED HISTORY: Reason for exam:->right ankle pain; ORDERING SYSTEM PROVIDED HISTORY: Acute pain of right shoulder TECHNOLOGIST PROVIDED HISTORY: Reason for exam:->shoulder pain; ORDERING SYSTEM PROVIDED HISTORY: Right hip pain TECHNOLOGIST PROVIDED HISTORY: Reason for exam:->right hip pain FINDINGS: Right shoulder: No fracture or dislocation. Normal soft tissues. No significant arthropathy. Right wrist and hand: No fracture or dislocation. No significant arthropathy. Normal soft tissues. Right hip and pelvis: No fracture or dislocation. No significant arthropathy. Normal soft tissues. Right knee: Mild patellar spurring. No fracture or dislocation. Normal soft tissues. Right ankle: No mortise widening. No fracture or dislocation. Normal soft tissues. Mild spurring at the right patella. Unremarkable right shoulder, wrist, hand and right ankle. XR KNEE RIGHT (MIN 4 VIEWS)    Result Date: 3/7/2022  EXAMINATION: 3 XRAY VIEWS OF THE RIGHT WRIST; FOUR XRAY VIEWS OF THE RIGHT KNEE; THREE XRAY VIEWS OF THE RIGHT HAND; THREE XRAY VIEWS OF THE RIGHT ANKLE; THREE XRAY VIEWS OF THE RIGHT SHOULDER; ONE XRAY VIEW OF THE PELVIS AND TWO XRAY VIEWS RIGHT HIP 3/7/2022 10:19 am COMPARISON: None. HISTORY: ORDERING SYSTEM PROVIDED HISTORY: Right wrist pain TECHNOLOGIST PROVIDED HISTORY: Reason for exam:->right wrist pain; ORDERING SYSTEM PROVIDED HISTORY: Acute pain of right knee TECHNOLOGIST PROVIDED HISTORY: Reason for exam:->pain in right knee; ORDERING SYSTEM PROVIDED HISTORY: Right hand pain TECHNOLOGIST PROVIDED HISTORY: Reason for exam:->right hand pain; ORDERING SYSTEM PROVIDED HISTORY: Acute right ankle pain TECHNOLOGIST PROVIDED HISTORY: Reason for exam:->right ankle pain; ORDERING SYSTEM PROVIDED HISTORY: Acute pain of right shoulder TECHNOLOGIST PROVIDED HISTORY: Reason for exam:->shoulder pain; ORDERING SYSTEM PROVIDED HISTORY: Right hip pain TECHNOLOGIST PROVIDED HISTORY: Reason for exam:->right hip pain FINDINGS: Right shoulder: No fracture or dislocation. Normal soft tissues. No significant arthropathy. Right wrist and hand: No fracture or dislocation. No significant arthropathy. Normal soft tissues. Right hip and pelvis: No fracture or dislocation. No significant arthropathy. Normal soft tissues.  Right knee: Mild patellar spurring. No fracture or dislocation. Normal soft tissues. Right ankle: No mortise widening. No fracture or dislocation. Normal soft tissues. Mild spurring at the right patella. Unremarkable right shoulder, wrist, hand and right ankle. XR ANKLE RIGHT (MIN 3 VIEWS)    Result Date: 3/7/2022  EXAMINATION: 3 XRAY VIEWS OF THE RIGHT WRIST; FOUR XRAY VIEWS OF THE RIGHT KNEE; THREE XRAY VIEWS OF THE RIGHT HAND; THREE XRAY VIEWS OF THE RIGHT ANKLE; THREE XRAY VIEWS OF THE RIGHT SHOULDER; ONE XRAY VIEW OF THE PELVIS AND TWO XRAY VIEWS RIGHT HIP 3/7/2022 10:19 am COMPARISON: None. HISTORY: ORDERING SYSTEM PROVIDED HISTORY: Right wrist pain TECHNOLOGIST PROVIDED HISTORY: Reason for exam:->right wrist pain; ORDERING SYSTEM PROVIDED HISTORY: Acute pain of right knee TECHNOLOGIST PROVIDED HISTORY: Reason for exam:->pain in right knee; ORDERING SYSTEM PROVIDED HISTORY: Right hand pain TECHNOLOGIST PROVIDED HISTORY: Reason for exam:->right hand pain; ORDERING SYSTEM PROVIDED HISTORY: Acute right ankle pain TECHNOLOGIST PROVIDED HISTORY: Reason for exam:->right ankle pain; ORDERING SYSTEM PROVIDED HISTORY: Acute pain of right shoulder TECHNOLOGIST PROVIDED HISTORY: Reason for exam:->shoulder pain; ORDERING SYSTEM PROVIDED HISTORY: Right hip pain TECHNOLOGIST PROVIDED HISTORY: Reason for exam:->right hip pain FINDINGS: Right shoulder: No fracture or dislocation. Normal soft tissues. No significant arthropathy. Right wrist and hand: No fracture or dislocation. No significant arthropathy. Normal soft tissues. Right hip and pelvis: No fracture or dislocation. No significant arthropathy. Normal soft tissues. Right knee: Mild patellar spurring. No fracture or dislocation. Normal soft tissues. Right ankle: No mortise widening. No fracture or dislocation. Normal soft tissues. Mild spurring at the right patella. Unremarkable right shoulder, wrist, hand and right ankle.      XR HIP 2-3 VW W PELVIS RIGHT    Result Date: 3/7/2022  EXAMINATION: 3 XRAY VIEWS OF THE RIGHT WRIST; FOUR XRAY VIEWS OF THE RIGHT KNEE; THREE XRAY VIEWS OF THE RIGHT HAND; THREE XRAY VIEWS OF THE RIGHT ANKLE; THREE XRAY VIEWS OF THE RIGHT SHOULDER; ONE XRAY VIEW OF THE PELVIS AND TWO XRAY VIEWS RIGHT HIP 3/7/2022 10:19 am COMPARISON: None. HISTORY: ORDERING SYSTEM PROVIDED HISTORY: Right wrist pain TECHNOLOGIST PROVIDED HISTORY: Reason for exam:->right wrist pain; ORDERING SYSTEM PROVIDED HISTORY: Acute pain of right knee TECHNOLOGIST PROVIDED HISTORY: Reason for exam:->pain in right knee; ORDERING SYSTEM PROVIDED HISTORY: Right hand pain TECHNOLOGIST PROVIDED HISTORY: Reason for exam:->right hand pain; ORDERING SYSTEM PROVIDED HISTORY: Acute right ankle pain TECHNOLOGIST PROVIDED HISTORY: Reason for exam:->right ankle pain; ORDERING SYSTEM PROVIDED HISTORY: Acute pain of right shoulder TECHNOLOGIST PROVIDED HISTORY: Reason for exam:->shoulder pain; ORDERING SYSTEM PROVIDED HISTORY: Right hip pain TECHNOLOGIST PROVIDED HISTORY: Reason for exam:->right hip pain FINDINGS: Right shoulder: No fracture or dislocation. Normal soft tissues. No significant arthropathy. Right wrist and hand: No fracture or dislocation. No significant arthropathy. Normal soft tissues. Right hip and pelvis: No fracture or dislocation. No significant arthropathy. Normal soft tissues. Right knee: Mild patellar spurring. No fracture or dislocation. Normal soft tissues. Right ankle: No mortise widening. No fracture or dislocation. Normal soft tissues. Mild spurring at the right patella. Unremarkable right shoulder, wrist, hand and right ankle. Medical Decision Making:     Vital signs reviewed    Past medical history reviewed. Allergies reviewed. Medications reviewed. Patient on arrival does not appear to be in any apparent distress or discomfort. The patient has been seen and evaluated. The patient does not appear to be toxic or lethargic.      The patient was sent for laboratory studies including a CBC, CMP, ELDA, C-reactive. Teen, sed rate, and uric acid. We obtained x-rays of the right shoulder, right wrist, right hand, right hip, right knee and the right ankle. The patient will monitor symptoms closely. We will hold off on any further medications at this time. The patient was also complaining of some vaginal infection. The patient did not want pelvic exam.  The patient states that she has had bacterial vaginosis in the past.  Patient will be treated with metronidazole. We discussed side effects of the medication and to avoid alcohol. The patient does need to follow-up with OB/GYN. The patient is to return to express care or go directly to the emergency department should any of the signs or symptoms worsen. The patient is to followup with primary care physician in 2-3 days for repeat evaluation. The patient has no other questions or concerns at this time the patient will be discharged home. Clinical Impression:   Mahin Nielson was seen today for shoulder pain, wrist pain, hand pain, hip pain, ankle pain and back pain. Diagnoses and all orders for this visit:    Acute pain of right shoulder  -     XR SHOULDER RIGHT (MIN 2 VIEWS); Future    Right wrist pain  -     XR WRIST RIGHT (MIN 3 VIEWS); Future    Right hand pain  -     XR HAND RIGHT (MIN 3 VIEWS); Future    Right hip pain  -     XR HIP 2-3 VW W PELVIS RIGHT; Future    Acute pain of right knee  -     XR KNEE RIGHT (MIN 4 VIEWS); Future    Acute right ankle pain  -     XR ANKLE RIGHT (MIN 3 VIEWS); Future    Arthralgia, unspecified joint  -     CBC with Auto Differential; Future  -     Comprehensive Metabolic Panel; Future  -     ELDA; Future  -     C-Reactive Protein; Future  -     Sedimentation Rate; Future  -     Uric Acid; Future    Other orders  -     metroNIDAZOLE (FLAGYL) 500 MG tablet;  Take 1 tablet by mouth in the morning and at bedtime for 7 days        The patient is to call for any concerns or return if any of the signs or symptoms worsen. The patient is to follow-up with PCP in the next 2-3 days for repeat evaluation repeat assessment or go directly to the emergency department.      SIGNATURE: Monique Hutchinson III, PA-C

## 2022-03-08 LAB
ANTI-NUCLEAR ANTIBODY (ANA): NEGATIVE
URIC ACID, SERUM: 3.4 MG/DL (ref 2.4–5.7)

## 2022-03-16 ENCOUNTER — OFFICE VISIT (OUTPATIENT)
Dept: PRIMARY CARE CLINIC | Age: 42
End: 2022-03-16
Payer: COMMERCIAL

## 2022-03-16 VITALS
TEMPERATURE: 97.6 F | WEIGHT: 178 LBS | HEART RATE: 77 BPM | OXYGEN SATURATION: 100 % | HEIGHT: 65 IN | RESPIRATION RATE: 18 BRPM | SYSTOLIC BLOOD PRESSURE: 110 MMHG | DIASTOLIC BLOOD PRESSURE: 82 MMHG | BODY MASS INDEX: 29.66 KG/M2

## 2022-03-16 DIAGNOSIS — M54.2 CHRONIC NECK PAIN: ICD-10-CM

## 2022-03-16 DIAGNOSIS — G89.29 CHRONIC RIGHT-SIDED LOW BACK PAIN WITH RIGHT-SIDED SCIATICA: Primary | ICD-10-CM

## 2022-03-16 DIAGNOSIS — G56.01 CARPAL TUNNEL SYNDROME OF RIGHT WRIST: ICD-10-CM

## 2022-03-16 DIAGNOSIS — G89.29 CHRONIC NECK PAIN: ICD-10-CM

## 2022-03-16 DIAGNOSIS — S46.011A STRAIN OF MUSC/TEND THE ROTATOR CUFF OF RIGHT SHOULDER, INIT: ICD-10-CM

## 2022-03-16 DIAGNOSIS — M25.50 ARTHRALGIA, UNSPECIFIED JOINT: ICD-10-CM

## 2022-03-16 DIAGNOSIS — M79.7 FIBROMYALGIA: ICD-10-CM

## 2022-03-16 DIAGNOSIS — M54.41 CHRONIC RIGHT-SIDED LOW BACK PAIN WITH RIGHT-SIDED SCIATICA: Primary | ICD-10-CM

## 2022-03-16 PROCEDURE — 99214 OFFICE O/P EST MOD 30 MIN: CPT | Performed by: FAMILY MEDICINE

## 2022-03-16 PROCEDURE — 1036F TOBACCO NON-USER: CPT | Performed by: FAMILY MEDICINE

## 2022-03-16 PROCEDURE — G8417 CALC BMI ABV UP PARAM F/U: HCPCS | Performed by: FAMILY MEDICINE

## 2022-03-16 PROCEDURE — G8484 FLU IMMUNIZE NO ADMIN: HCPCS | Performed by: FAMILY MEDICINE

## 2022-03-16 PROCEDURE — G8427 DOCREV CUR MEDS BY ELIG CLIN: HCPCS | Performed by: FAMILY MEDICINE

## 2022-03-16 ASSESSMENT — ENCOUNTER SYMPTOMS
SORE THROAT: 0
RHINORRHEA: 0
DIARRHEA: 0
ABDOMINAL PAIN: 0
VOMITING: 0
SHORTNESS OF BREATH: 0
WHEEZING: 0
NAUSEA: 0
CONSTIPATION: 0
BACK PAIN: 1

## 2022-03-16 ASSESSMENT — PATIENT HEALTH QUESTIONNAIRE - PHQ9
5. POOR APPETITE OR OVEREATING: 0
8. MOVING OR SPEAKING SO SLOWLY THAT OTHER PEOPLE COULD HAVE NOTICED. OR THE OPPOSITE, BEING SO FIGETY OR RESTLESS THAT YOU HAVE BEEN MOVING AROUND A LOT MORE THAN USUAL: 0
10. IF YOU CHECKED OFF ANY PROBLEMS, HOW DIFFICULT HAVE THESE PROBLEMS MADE IT FOR YOU TO DO YOUR WORK, TAKE CARE OF THINGS AT HOME, OR GET ALONG WITH OTHER PEOPLE: 0
SUM OF ALL RESPONSES TO PHQ QUESTIONS 1-9: 0
7. TROUBLE CONCENTRATING ON THINGS, SUCH AS READING THE NEWSPAPER OR WATCHING TELEVISION: 0
SUM OF ALL RESPONSES TO PHQ9 QUESTIONS 1 & 2: 0
3. TROUBLE FALLING OR STAYING ASLEEP: 0
9. THOUGHTS THAT YOU WOULD BE BETTER OFF DEAD, OR OF HURTING YOURSELF: 0
2. FEELING DOWN, DEPRESSED OR HOPELESS: 0
1. LITTLE INTEREST OR PLEASURE IN DOING THINGS: 0
SUM OF ALL RESPONSES TO PHQ QUESTIONS 1-9: 0
6. FEELING BAD ABOUT YOURSELF - OR THAT YOU ARE A FAILURE OR HAVE LET YOURSELF OR YOUR FAMILY DOWN: 0
SUM OF ALL RESPONSES TO PHQ QUESTIONS 1-9: 0
SUM OF ALL RESPONSES TO PHQ QUESTIONS 1-9: 0
4. FEELING TIRED OR HAVING LITTLE ENERGY: 0

## 2022-03-16 NOTE — PROGRESS NOTES
3/16/2022     Chief Complaint   Patient presents with    Results     bloodwork and xray-patient seen in [facility]  Rachel Chowdhury (:  1980) is a 35 y.o. female, here for evaluation of the following medical concerns:    Patient is a 68-year-old female with a past medical history subclinical hypothyroidism, chronic low back pain, dermatitis, depression/anxiety, fibromyalgia,  who presents today for an acute office apt. Patient was recently seen in our walk-in clinic for multiple issues as follows: Right-sided shoulder pain, wrist pain, hand pain, hip pain, ankle pain, back pain. The symptoms have been worsening over the last 1 to 2 months. Patient denies any injuries. No falls. No significant swelling. Pain is all in the right side of her body. Patient also had blood work performed. CBC essentially within normal limits besides elevated MCV of 102. CMP within normal limits. ELDA negative. C-reactive protein negative. Sed rate 10. Uric acid 3.4. Previously patient had lab work showing normal TSH and (-) rheumatoid factor. History of slightly low/borderline B12/folate levels. Patient's imaging shows mild spurring at the right patella. Otherwise unremarkable right shoulder, wrist, hand, and right ankle. Symptoms all started about 1 month ago. Reports swelling all along the right side of her body along with pain. Pain is sharp, achy. Review of Systems   Constitutional: Negative for chills and fever. HENT: Negative for congestion, rhinorrhea and sore throat. Respiratory: Negative for shortness of breath and wheezing. Cardiovascular: Negative for chest pain and leg swelling. Gastrointestinal: Negative for abdominal pain, constipation, diarrhea, nausea and vomiting. Musculoskeletal: Positive for arthralgias, back pain, myalgias and neck pain. Skin: Negative for rash. Neurological: Negative for light-headedness and headaches.        Past Medical History:   Diagnosis Date    Abnormal Pap smear     Anxiety     [facility]     Asthma     Hx LEEP (loop electrosurgical excision procedure), cervix, pregnancy 2008    Major depressive disorder, recurrent episode, unspecified 8/5/2011    Mental disorders of mother, antepartum 8/5/2011       Prior to Visit Medications    Medication Sig Taking? Authorizing Provider   albuterol sulfate  (90 Base) MCG/ACT inhaler Inhale 2 puffs into the lungs 4 times daily as needed for Wheezing Yes JENARO Stewart III   ARIPiprazole (ABILIFY) 2 MG tablet Take 1 tablet by mouth nightly Yes Historical Provider, MD   topiramate (TOPAMAX) 200 MG tablet Take 1 tablet by mouth daily Yes Historical Provider, MD   desvenlafaxine succinate (PRISTIQ) 50 MG TB24 extended release tablet Take 1 tablet by mouth daily Yes Historical Provider, MD   amphetamine-dextroamphetamine (ADDERALL XR) 25 MG extended release capsule Take 1 capsule by mouth 2 times daily. Yes Historical Provider, MD   traZODone (DESYREL) 150 MG tablet Take 1 tablet by mouth daily Yes Historical Provider, MD   ALPRAZolam [place] Bohr) 1 MG tablet  Yes Historical Provider, MD   diclofenac (CATAFLAM) 50 MG tablet Take 1 tablet by mouth 2 times daily  Rosa Salcedo DPM        No Known Allergies    Social History     Tobacco Use    Smoking status: Former Smoker    Smokeless tobacco: Never Used   Substance Use Topics    Alcohol use: Yes     Comment: occassionaly           Vitals:    03/16/22 1535   BP: 110/82   Pulse: 77   Resp: 18   Temp: 97.6 °F (36.4 °C)   TempSrc: Temporal   SpO2: 100%   Weight: 178 lb (80.7 kg)   Height: 5' 5\" (1.651 m)     Estimated body mass index is 29.62 kg/m² as calculated from the following:    Height as of this encounter: 5' 5\" (1.651 m). Weight as of this encounter: 178 lb (80.7 kg). Physical Exam  Constitutional:       Appearance: She is well-developed. HENT:      Head: Normocephalic.    Eyes:      Extraocular Movements: Extraocular movements intact. Conjunctiva/sclera: Conjunctivae normal.   Cardiovascular:      Rate and Rhythm: Normal rate and regular rhythm. Heart sounds: Normal heart sounds. No murmur heard. Pulmonary:      Effort: Pulmonary effort is normal.      Breath sounds: Normal breath sounds. No wheezing or rales. Abdominal:      General: Bowel sounds are normal.      Palpations: Abdomen is soft. Tenderness: There is no abdominal tenderness. Musculoskeletal:      Right lower leg: No edema. Left lower leg: No edema. Comments: Patient does have some mild tenderness on palpation of her right side of her lower neck, right shoulder, right hip. Patient has positive right-sided rotator cuff testing. Positive right-sided carpal tunnel testing. Straight leg raise on the right side does cause worsening right-sided lower back pain. No radicular symptoms currently. Neurological:      General: No focal deficit present. Mental Status: She is alert. Deep Tendon Reflexes:      Reflex Scores:       Patellar reflexes are 2+ on the right side and 2+ on the left side. Comments: Cranial nerves grossly intact   Psychiatric:         Mood and Affect: Mood normal.         Judgment: Judgment normal.         ASSESSMENT/PLAN:  Cora was seen today for results. Diagnoses and all orders for this visit:    Chronic right-sided low back pain with right-sided sciatica  -     EMG; Future    Fibromyalgia    Chronic neck pain  -     XR CERVICAL SPINE (4-5 VIEWS); Future  -     EMG; Future    Arthralgia, unspecified joint  -     XR CERVICAL SPINE (4-5 VIEWS); Future    Carpal tunnel syndrome of right wrist    Strain of musc/tend the rotator cuff of right shoulder, init    Will start with the above work-up in regards of patient's issues. Patient will most likely need to start with physical therapy.   After completing physical therapy will consider PMNR versus pain management versus MRIs of her cervical spine and lumbar spine.  Will call patient with results of the above testing. Patient continue OTC medications. We did review with the patient her x-ray imaging and labs. In total greater than 32 minutes in total duration was spent on the encounter. Return if symptoms worsen or fail to improve. An electronicsignature was used to authenticate this note.     --Kole Barnes MD on 3/16/22 at 10:04 AM EDT

## 2022-03-31 ENCOUNTER — OFFICE VISIT (OUTPATIENT)
Dept: NEUROLOGY | Age: 42
End: 2022-03-31
Payer: COMMERCIAL

## 2022-03-31 VITALS
HEART RATE: 123 BPM | HEIGHT: 65 IN | DIASTOLIC BLOOD PRESSURE: 82 MMHG | BODY MASS INDEX: 29.99 KG/M2 | WEIGHT: 180 LBS | TEMPERATURE: 97.4 F | SYSTOLIC BLOOD PRESSURE: 112 MMHG | OXYGEN SATURATION: 100 %

## 2022-03-31 DIAGNOSIS — G56.21 ULNAR NEUROPATHY OF RIGHT UPPER EXTREMITY: ICD-10-CM

## 2022-03-31 DIAGNOSIS — G56.01 RIGHT CARPAL TUNNEL SYNDROME: Primary | ICD-10-CM

## 2022-03-31 DIAGNOSIS — M54.41 CHRONIC RIGHT-SIDED LOW BACK PAIN WITH RIGHT-SIDED SCIATICA: ICD-10-CM

## 2022-03-31 DIAGNOSIS — M54.17 RIGHT LUMBOSACRAL RADICULOPATHY: ICD-10-CM

## 2022-03-31 DIAGNOSIS — M54.2 CHRONIC NECK PAIN: ICD-10-CM

## 2022-03-31 DIAGNOSIS — G89.29 CHRONIC NECK PAIN: ICD-10-CM

## 2022-03-31 DIAGNOSIS — G89.29 CHRONIC RIGHT-SIDED LOW BACK PAIN WITH RIGHT-SIDED SCIATICA: ICD-10-CM

## 2022-03-31 PROCEDURE — 95886 MUSC TEST DONE W/N TEST COMP: CPT | Performed by: PSYCHIATRY & NEUROLOGY

## 2022-03-31 PROCEDURE — 95911 NRV CNDJ TEST 9-10 STUDIES: CPT | Performed by: PSYCHIATRY & NEUROLOGY

## 2022-03-31 NOTE — PROGRESS NOTES
[address] [facility]  Electrodiagnostic Laboratory  *Accredited by the [address] [place] [address] with exemplary status  [address] [facility], [facility] [place]  Phone: [phone]  Fax: [phone]    Referring Provider: Gillian Ferrara MD  Primary Care Physician: Deep Tyler MD  Patient Name: Antionette Krueger  Patient YOB: 1980  Gender: female  BMI: Body mass index is 29.95 kg/m². Blood pressure 112/82, pulse 123, temperature 97.4 °F (36.3 °C), temperature source Temporal, height 5' 5\" (1.651 m), weight 180 lb (81.6 kg), SpO2 100 %, unknown if currently breastfeeding. 3/31/2022    Description of clinical problem: Hx right-sided low back pain, right sciatica; bilateral carpal tunnel syndrome; chronic neck pain. X-ray cervical spine: No significant findings reported. Chief Complaint   Patient presents with    Other     emg      Pain Yes-rt. Sciatica, right medial wrist   ; Numbness/tingling  Yes-hands; Weakness  No       Brief physical exam:   Sensory deficit No; Weakness No; Atrophy  No; Reflex abnormality No  Limited neurologic exam performed of the right upper and right lower extremities shows grossly intact 5/5 power, effort-related, left hand  strength, wrist flexion/extension, finger abduction/adduction, thumb opposition, biceps/triceps, deltoids, shoulder shrug, related to underlying pain complaint, and normal motor tone. DTRs: 1+ and symmetric. Negative Tinel's test.  Sensory: Grossly intact subjectively to light touch and sharp stick testing. Musculoskeletal: Negative straight leg raising test.  No foot drop, no fasciculations.     Study Limitations: Pain      Full Name: Antionette Krueger Gender: Female  MRN: [id number] YOB: 1980      Visit Date: 3/31/2022 08:36  Age: 35 Years 11 Months Old  Examining Physician: Dr. Ashtyn Higuera   Referring Physician: Dr. Nesha Suárez  Technician: Noelle Mullen   Height: 5 feet 5 inch  Weight: 180 lbs      Motor NCS      Nerve / Sites Latency Amplitude Amp. 1-2 Distance Lat Diff Velocity Temp.    ms mV % cm ms m/s °C   R Median - APB      Wrist 3.96 13.5 100 8   32      Elbow 7.40 14.0 104 17 3.44 49 32   R Ulnar - ADM      Wrist 2.92 9.3 100 8   32.9      B. Elbow 5.99 9.4 102 17 3.07 55 32.9      A. Elbow 7.60 8.2 88.3 10 1.61 62 32.9   R Peroneal - EDB      Ankle 3.85 7.9 100 8   31.2      Pop fossa 12.14 7.1 90.5 37 8.28 45 31.2   R Tibial - AH      Ankle 4.74 8.1 100 8   31.1      Pop fossa 14.53 5.9 72.8 39 9.79 40 31.1               Sensory NCS      Nerve / Sites Onset Lat Peak Lat PP Amp Amp. 1-2 Distance Velocity Temp.    ms ms µV % cm m/s °C   R Median - Digit II (Antidromic)      Mid Palm 1.56 2.45 54.2 100 7 45 32.9      Wrist 3.44 4.43 44.7 87.3 14 41 33   R Ulnar - Digit V (Antidromic)      Wrist 3.23 4.11 59.2 100 14 43 32.9   R Radial - Anatomical  (Forearm)      Forearm 1.77 2.40 28.5 100 10 56 32.7   R Sural - Ankle (Calf)      Calf 3.28 3.70 21.1 100 14 43 31.2   R Superficial peroneal - Ankle      Lat leg 2.55 3.33 13.9 100 10 39 31.2                 F  Wave      Nerve F Lat M Lat F-M Lat    ms ms ms   R Peroneal-EDB 53.2 3.2 50.0   R Tibial- AH 52.8 4.7 48.1   R Median- APB 27.9 4.2 23.8   R Ulnar- ADM 28.1 3.1 25.1       H Reflex      Nerve Lat Hmax    ms   R Tibial - Soleus 29.8   L Tibial - Soleus 30.4       EMG         EMG Summary Table     Spontaneous MUAP Recruitment   Muscle IA Fib PSW Fasc H.F. Amp Dur. PPP Pattern   R. Tibialis anterior Normal None None None None Normal Normal None Normal   R. Gastroc (Medial head) Normal None None None None 1+ 1+ None Reduced   R. Flexor digitorum longus Normal None None None None Normal Normal None *PA-Pain   R. Extensor digitorum brevis Normal None None None None Giant 2+ None Reduced   R. Abductor hallucis Normal None None None None 2+ 2+ None Reduced   R.  Vastus lateralis Normal None None None None 1+ 1+ None Reduced   R. Gluteus medius Normal None None None None Normal Normal None Normal R. Sacral paraspinals Normal None None None None -- -- -- --   R. Lumbar paraspinals (low) Normal None None None None -- -- -- --   R. First dorsal interosseous Normal None None None None 1+ 1+ None Reduced   R. Adductor pollicis Normal None None None None 1+ 1+ None Reduced   R. Abductor pollicis brevis Normal None None None None 1+ 1+ 1+ Reduced-mild   R. Flexor pollicis longus Normal None None None None Normal Normal None Normal   R. Extensor digitorum communis Normal None None None None Normal Normal None Normal   R. Pronator teres Normal None None None None Normal Normal None Normal   R. Biceps brachii Normal None None None None Normal Normal None Normal   R. Triceps brachii Normal None None None None Normal Normal None Normal   R. Deltoid Normal None None None None Normal Normal None Normal   R. Cervical paraspinals (low) Normal None None None None -- -- -- --       *PA= poor activation (poor voluntary effort, pain)    Summary of Findings:   Nerve conduction studies:   · The following nerve conduction studies were abnormal:   · The right median sensory nerve conduction (recording from the second digit) is mildly prolonged in distal latency with normal amplitude. · The right ulnar sensory nerve conduction (recording from the fifth digit) is mildly prolonged in distal latency with normal amplitude. · All other nerve conduction studies listed in the table above were normal in latency, amplitude and conduction velocity. Needle EMG:   · Needle EMG was performed using a concentric needle. · The following abnormalities were seen on needle EMG: Enlarged motor unit potentials in duration and amplitude with mildly decreased recruitment (loss of motor units) in the right ulnar-innervated intrinsic hand muscles and right abductor pollicis brevis (thenar) muscle with 1+ polyphasia (reinnervation motor units).   · Needle EMG examination of the right lower extremity showed mild-moderate enlargement of the better unit potentials in duration and amplitude with decreased recruitment (loss of motor units) in the right lumbosacral (I.e., primarily L5/S1 myotomal distribution) of mild-moderate degree.  All other muscles tested, as listed in the table above demonstrated normal amplitude, duration, phases and recruitment and no active denervation signs were seen. Diagnostic Interpretation: This study was abnormal.     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 2-A chronic right ulnar neuropathy, nonlocalizable, but probably at or around the level of the elbow with chronic denervation of a mild-moderate degree. 3-A chronic right lumbosacral radiculopathy (I.e., primarily L5/S1 myotomal distribution) with chronic denervation of a mild-moderate degree. 4-A chronic right C8/T1 cervical radiculopathy cannot be excluded underlying the above. 5-There is no electrodiagnostic evidence of a chronic large fiber sensorimotor peripheral neuropathy. There is not a prior study for comparison. Clinical correlation is recommended    Technologist:   Physician: Joaquín Ornelas MD    Nerve conduction studies and electromyography were performed according to our laboratory policies and procedures which can be provided upon request. All abnormal values are identified in the table.  Laboratory normal values can also be provided upon request.       Cc: MD Raciel Fang MD

## 2022-04-11 DIAGNOSIS — R94.131 ABNORMAL EMG: Primary | ICD-10-CM

## 2022-04-11 DIAGNOSIS — M79.601 RIGHT ARM PAIN: ICD-10-CM

## 2022-04-22 ENCOUNTER — TELEPHONE (OUTPATIENT)
Dept: PRIMARY CARE CLINIC | Age: 42
End: 2022-04-22

## 2022-04-22 NOTE — TELEPHONE ENCOUNTER
Pt is calling back about the when the EMG results were called to her because she thinks there was some miscommunication, patient would like an injection nerve block not a medication.  She is asking if she can be referred to someone

## 2022-04-25 DIAGNOSIS — M54.2 CHRONIC NECK PAIN: ICD-10-CM

## 2022-04-25 DIAGNOSIS — G89.29 CHRONIC RIGHT-SIDED LOW BACK PAIN WITH RIGHT-SIDED SCIATICA: Primary | ICD-10-CM

## 2022-04-25 DIAGNOSIS — M54.41 CHRONIC RIGHT-SIDED LOW BACK PAIN WITH RIGHT-SIDED SCIATICA: Primary | ICD-10-CM

## 2022-04-25 DIAGNOSIS — G89.29 CHRONIC NECK PAIN: ICD-10-CM

## 2022-04-25 NOTE — TELEPHONE ENCOUNTER
Currently having trouble walking she has pain down right leg all the way to her foot. She is most concerned about this issue due to her mobility. Asking for referral for injections in her back.

## 2022-05-04 ENCOUNTER — OFFICE VISIT (OUTPATIENT)
Dept: PAIN MANAGEMENT | Age: 42
End: 2022-05-04
Payer: COMMERCIAL

## 2022-05-04 VITALS
HEIGHT: 65 IN | TEMPERATURE: 96.8 F | DIASTOLIC BLOOD PRESSURE: 80 MMHG | RESPIRATION RATE: 16 BRPM | WEIGHT: 180 LBS | HEART RATE: 82 BPM | SYSTOLIC BLOOD PRESSURE: 122 MMHG | BODY MASS INDEX: 29.99 KG/M2 | OXYGEN SATURATION: 95 %

## 2022-05-04 DIAGNOSIS — M47.816 LUMBAR SPONDYLOSIS: ICD-10-CM

## 2022-05-04 DIAGNOSIS — M54.16 LUMBAR RADICULOPATHY: ICD-10-CM

## 2022-05-04 DIAGNOSIS — G89.4 CHRONIC PAIN SYNDROME: ICD-10-CM

## 2022-05-04 DIAGNOSIS — M47.816 LUMBAR FACET ARTHROPATHY: Primary | ICD-10-CM

## 2022-05-04 DIAGNOSIS — M51.9 LUMBAR DISC DISORDER: ICD-10-CM

## 2022-05-04 PROCEDURE — 99204 OFFICE O/P NEW MOD 45 MIN: CPT | Performed by: PAIN MEDICINE

## 2022-05-04 PROCEDURE — G8417 CALC BMI ABV UP PARAM F/U: HCPCS | Performed by: PAIN MEDICINE

## 2022-05-04 PROCEDURE — 1036F TOBACCO NON-USER: CPT | Performed by: PAIN MEDICINE

## 2022-05-04 PROCEDURE — G8427 DOCREV CUR MEDS BY ELIG CLIN: HCPCS | Performed by: PAIN MEDICINE

## 2022-05-04 NOTE — PROGRESS NOTES
[facility]        [address] [facility], [address] [facility]      [phone]          Consult Note      Patient:  KEVIN Chambers 1980    Date of Service:  22    Requesting Physician:  Gildardo Salas MD    Reason for Consult:      Patient presents with complaints of low back pain that started a long time ago and has been progressively getting worse. Pain is described as sharp/stabbing/shooting. Pain is aggravated by walking/sleeping in bed. Pain is relieved by nothing. HISTORY OF PRESENT ILLNESS:      Pain does radiate to right lower extremity down to her foot. She  has tingling of the right thigh and does not have bladder or bowel dysfunction. She has not been on anticoagulation medications to include none. The patient  has not been on herbal supplements. The patient is not diabetic. Imaging:  Lumbar spine Xray :  Lower lumbar degenerative disc disease and facet osteoarthritis, most   pronounced at L5-S1. Previous treatments: Physical Therapy and medications. .      Opioid Agreement:  Renewal date:N/A    Past Medical History:   Diagnosis Date    Abnormal Pap smear     Anxiety     [facility]     Asthma     Chronic pain     Hx LEEP (loop electrosurgical excision procedure), cervix, pregnancy     Major depressive disorder, recurrent episode, unspecified 2011    Mental disorders of mother, antepartum 2011     Past Surgical History:   Procedure Laterality Date    BREAST SURGERY      abscess removed from left breast     SECTION      LEEP       Prior to Admission medications    Medication Sig Start Date End Date Taking?  Authorizing Provider   ARIPiprazole (ABILIFY) 2 MG tablet Take 1 tablet by mouth nightly 21  Yes Historical Provider, MD   topiramate (TOPAMAX) 200 MG tablet Take 1 tablet by mouth daily 21  Yes Historical Provider, MD   desvenlafaxine succinate (PRISTIQ) 50 MG TB24 extended release tablet Take 1 tablet by mouth daily 1/29/21  Yes Historical Provider, MD   amphetamine-dextroamphetamine (ADDERALL XR) 25 MG extended release capsule Take 1 capsule by mouth 2 times daily. 1/29/21  Yes Historical Provider, MD   traZODone (DESYREL) 150 MG tablet Take 1 tablet by mouth daily 1/29/21  Yes Historical Provider, MD   ALPRAZolam Evaline Beams) 1 MG tablet  10/19/15  Yes Historical Provider, MD   diclofenac (CATAFLAM) 50 MG tablet Take 1 tablet by mouth 2 times daily 4/22/21 6/21/21  Lyudmila Vasquez DPM     No Known Allergies    Social History     Socioeconomic History    Marital status:      Spouse name: Not on file    Number of children: Not on file    Years of education: Not on file    Highest education level: Not on file   Occupational History    Not on file   Tobacco Use    Smoking status: Former Smoker    Smokeless tobacco: Never Used   Substance and Sexual Activity    Alcohol use: Yes     Comment: occassionaly    Drug use: No    Sexual activity: Yes     Partners: Male   Other Topics Concern    Not on file   Social History Narrative    Not on file     Social Determinants of Health     Financial Resource Strain: Low Risk     Difficulty of Paying Living Expenses: Not hard at all   Food Insecurity: No Food Insecurity    Worried About [facility] in the Last Year: Never true    Dennis of Food in the Last Year: Never true   Transportation Needs:     Lack of Transportation (Medical): Not on file    Lack of Transportation (Non-Medical):  Not on file   Physical Activity:     Days of Exercise per Week: Not on file    Minutes of Exercise per Session: Not on file   Stress:     Feeling of Stress : Not on file   Social Connections:     Frequency of Communication with Friends and Family: Not on file    Frequency of Social Gatherings with Friends and Family: Not on file    Attends Restoration Services: Not on file    Active Member of Clubs or Organizations: Not on file  Attends Club or Organization Meetings: Not on file    Marital Status: Not on file   Intimate Partner Violence:     Fear of Current or Ex-Partner: Not on file    Emotionally Abused: Not on file    Physically Abused: Not on file    Sexually Abused: Not on file   Housing Stability:     Unable to Pay for Housing in the Last Year: Not on file    Number of [place] in the Last Year: Not on file    Unstable Housing in the Last Year: Not on file     Family History   Problem Relation Age of Onset    Breast Cancer Father     Pulmonary Fibrosis Mother      REVIEW OF SYSTEMS:     Patient specifically denies fever/chills, chest pain, shortness of breath, new bowel or bladder complaints. All other review of systems was negative. PHYSICAL EXAMINATION:      /80   Pulse 82   Temp 96.8 °F (36 °C) (Infrared)   Resp 16   Ht 5' 5\" (1.651 m)   Wt 180 lb (81.6 kg)   LMP 04/04/2022   SpO2 95%   BMI 29.95 kg/m²     General:      General appearance:   pleasant and well-hydrated. , in moderate discomfort and A & O x3  Build:Normal Weight    HEENT:    Head:normocephalic and atraumatic  Sclera: icterus absent,     Lungs:    Breathing:Breathing Pattern: regular, no distress    Abdomen:    Shape:non-distended and normal  Tenderness:none    Lumbar spine:    Spine inspection:normal   CVA tenderness:No   Palpation:tenderness paravertebral muscles. Range of motion:abnormal moderately Lateral bending, flexion, extension rotation bilateral and is  painful.     Musculoskeletal:    Trigger points in Paraveteral:absent bilaterally  SI joint tenderness:negative right, negative left              ISAIAS test:negative right, negative             left  Piriformis tenderness:negative right, negative left  Trochanteric bursa tenderness:negative right, negative left  SLR:positive right, negative left, sitting     Extremities:    Tremors:None bilaterally upper and lower  Range of motion: pain with internal rotation of hips negative. Intact:Yes  Edema:Normal    Neurological:    Sensory:normal to light touch bilateral lower extremities  Motor:                             Right Quadriceps4-/5          Left Quadriceps5-/5           Right Gastrocnemius4-/5    Left Gastrocnemius5-/5  Right Ant Tibialis4-/5  Left Ant Tibialis5-/5  Reflexes:    Right Quadriceps reflex2+  Left Quadriceps reflex2+  Right Achilles reflex2+  Left Achilles reflex2+  Gait:antalgic    Dermatology:    Skin:no unusual rashes and no skin lesions    Impression:  Low back pain with radiation to the right lower extremity. Lumbar spine Xray Lower lumbar degenerative disc disease and facet osteoarthritis, most pronounced at L5-S1. Plan:  Lumbar radiculopathy. Worsening low back pain with radiation to the right lower extremity. Will schedule patient for lumbar spine MRI(positive straight leg test on the right with right lower extremity weakness). Cancel urine screen. OARRS report reviewed 05/2022. Patient encouraged to stay active and to lose weight  Treatment plan discussed with the patient. We discussed with the patient that combining opioids, benzodiazepines, alcohol, illicit drugs or sleep aids increases the risk of respiratory depression including death. We discussed that these medications may cause drowsiness, sedation or dizziness and have counseled the patient not to drive or operate machinery. We have discussed that these medications will be prescribed only by one provider. We have discussed with the patient about age related risk factors and have thoroughly discussed the importance of taking these medications as prescribed. The patient verbalizes understanding. lev Johnson M.D.

## 2022-05-04 NOTE — PROGRESS NOTES
Do you currently have any of the following:    Fever: No  Headache:  No  Cough: No  Shortness of breath: No  Exposed to anyone with these symptoms: No                                                                                                                Debbi Coleman presents to the [facility] on 5/4/2022. Cora is complaining of pain in her lower back and right leg. . The pain is constant. The pain is described as aching, throbbing, shooting, stabbing, sharp and numb. Pain is rated on her best day at a 8, on her worst day at a 10, and on average at a 9 on the VAS scale. She took her last dose of Motrin and OTC patches, rubs and ointments . Cora does not have issues with constipation. Any procedures since your last visit: No,      She is  on NSAIDS and  is not on anticoagulation medications to include none and is managed by NA. Pacemaker or defibrillator: No Physician managing device is NA. Medication Contract and Consent for Opioid Use Documents Filed      No documents found                   /80   Pulse 82   Temp 96.8 °F (36 °C) (Infrared)   Resp 16   Ht 5' 5\" (1.651 m)   Wt 180 lb (81.6 kg)   LMP 04/04/2022   SpO2 95%   BMI 29.95 kg/m²      Patient's last menstrual period was 04/04/2022.

## 2022-05-13 ENCOUNTER — TELEPHONE (OUTPATIENT)
Dept: PAIN MANAGEMENT | Age: 42
End: 2022-05-13

## 2022-05-13 DIAGNOSIS — M54.16 LUMBAR RADICULOPATHY: Primary | ICD-10-CM

## 2022-05-13 RX ORDER — ACETAMINOPHEN AND CODEINE PHOSPHATE 300; 30 MG/1; MG/1
1 TABLET ORAL 2 TIMES DAILY PRN
Qty: 28 TABLET | Refills: 0 | Status: SHIPPED | OUTPATIENT
Start: 2022-05-13 | End: 2022-05-27

## 2022-05-13 NOTE — TELEPHONE ENCOUNTER
Patient is phoning in for pain medicaiton. She is having worse lower back pain and down the right leg.

## 2022-05-25 ENCOUNTER — OFFICE VISIT (OUTPATIENT)
Dept: PAIN MANAGEMENT | Age: 42
End: 2022-05-25
Payer: COMMERCIAL

## 2022-05-25 ENCOUNTER — HOSPITAL ENCOUNTER (OUTPATIENT)
Dept: MRI IMAGING | Age: 42
Discharge: HOME | End: 2022-05-27
Payer: COMMERCIAL

## 2022-05-25 ENCOUNTER — TELEPHONE (OUTPATIENT)
Dept: PAIN MANAGEMENT | Age: 42
End: 2022-05-25

## 2022-05-25 VITALS
TEMPERATURE: 98.7 F | WEIGHT: 180 LBS | HEIGHT: 65 IN | RESPIRATION RATE: 16 BRPM | OXYGEN SATURATION: 98 % | SYSTOLIC BLOOD PRESSURE: 122 MMHG | DIASTOLIC BLOOD PRESSURE: 78 MMHG | HEART RATE: 113 BPM | BODY MASS INDEX: 29.99 KG/M2

## 2022-05-25 DIAGNOSIS — M51.9 LUMBAR DISC DISORDER: ICD-10-CM

## 2022-05-25 DIAGNOSIS — M54.16 LUMBAR RADICULOPATHY: ICD-10-CM

## 2022-05-25 DIAGNOSIS — M47.816 LUMBAR SPONDYLOSIS: ICD-10-CM

## 2022-05-25 DIAGNOSIS — M47.816 LUMBAR FACET ARTHROPATHY: ICD-10-CM

## 2022-05-25 DIAGNOSIS — M54.16 LUMBAR RADICULOPATHY: Primary | ICD-10-CM

## 2022-05-25 DIAGNOSIS — G89.4 CHRONIC PAIN SYNDROME: ICD-10-CM

## 2022-05-25 PROCEDURE — 1036F TOBACCO NON-USER: CPT | Performed by: PAIN MEDICINE

## 2022-05-25 PROCEDURE — G8417 CALC BMI ABV UP PARAM F/U: HCPCS | Performed by: PAIN MEDICINE

## 2022-05-25 PROCEDURE — G8427 DOCREV CUR MEDS BY ELIG CLIN: HCPCS | Performed by: PAIN MEDICINE

## 2022-05-25 PROCEDURE — 72148 MRI LUMBAR SPINE W/O DYE: CPT

## 2022-05-25 PROCEDURE — 99213 OFFICE O/P EST LOW 20 MIN: CPT | Performed by: PAIN MEDICINE

## 2022-05-25 PROCEDURE — 99214 OFFICE O/P EST MOD 30 MIN: CPT | Performed by: PAIN MEDICINE

## 2022-05-25 PROCEDURE — 99213 OFFICE O/P EST LOW 20 MIN: CPT

## 2022-05-25 RX ORDER — SODIUM CHLORIDE 9 MG/ML
INJECTION, SOLUTION INTRAVENOUS PRN
Status: CANCELLED | OUTPATIENT
Start: 2022-05-25

## 2022-05-25 RX ORDER — ARIPIPRAZOLE 5 MG/1
TABLET ORAL
COMMUNITY
Start: 2022-05-11 | End: 2022-08-24 | Stop reason: DRUGHIGH

## 2022-05-25 RX ORDER — NABUMETONE 500 MG/1
500 TABLET, FILM COATED ORAL 2 TIMES DAILY
Qty: 60 TABLET | Refills: 0 | Status: SHIPPED
Start: 2022-05-25 | End: 2022-08-24 | Stop reason: ALTCHOICE

## 2022-05-25 RX ORDER — SODIUM CHLORIDE 0.9 % (FLUSH) 0.9 %
5-40 SYRINGE (ML) INJECTION PRN
Status: CANCELLED | OUTPATIENT
Start: 2022-05-25

## 2022-05-25 RX ORDER — COVID-19 ANTIGEN TEST
KIT MISCELLANEOUS
COMMUNITY

## 2022-05-25 RX ORDER — ACETAMINOPHEN 500 MG
500 TABLET ORAL EVERY 6 HOURS PRN
COMMUNITY

## 2022-05-25 RX ORDER — SODIUM CHLORIDE 0.9 % (FLUSH) 0.9 %
5-40 SYRINGE (ML) INJECTION EVERY 12 HOURS SCHEDULED
Status: CANCELLED | OUTPATIENT
Start: 2022-05-25

## 2022-05-25 RX ORDER — IBUPROFEN 200 MG
200 TABLET ORAL EVERY 6 HOURS PRN
COMMUNITY

## 2022-05-25 RX ORDER — DESVENLAFAXINE 100 MG/1
TABLET, EXTENDED RELEASE ORAL
COMMUNITY
Start: 2022-05-11 | End: 2022-08-24 | Stop reason: DRUGHIGH

## 2022-05-25 NOTE — TELEPHONE ENCOUNTER
Call to [facility] that procedure was approved for 06/02/2022 and that the surgery center should call her a few days before for the pre op call and after 3:00 PM the business day before with the arrival time. Instructed Cora to hold ibuprofen for 24 hours, relafen for 6 days, last dose to be 5-27-22, naprosyn for 4 days and any aspirin containing products or fish oil for 7 days. Instructed to call office back if any questions. Cora verbalized understanding.     Radames Sanchez RN  Pain Management

## 2022-05-25 NOTE — PROGRESS NOTES
[address]  [address] [facility], [address] [place] [address]  [phone]    Follow up Note      Florence Pinon     Date of Visit:  2022    CC:  Patient presents for follow up   Chief Complaint   Patient presents with    Results     MRI     HPI:    Pain is unchanged. Appropriate analgesia with current medications regimen: No.    Change in quality of symptoms:no. Medication side effects:none. Recent diagnostic testing:Lumbar spine MRI. Recent interventional procedures:none. She has not been on anticoagulation medications to include none. The patient  has not been on herbal supplements. The patient is not diabetic. Imaging:  Lumbar spine Xray :  Lower lumbar degenerative disc disease and facet osteoarthritis, most   pronounced at L5-S1. Lumbar spine MRI   Degenerative change most pronounced in the lower lumbar spine with mild   bilateral foraminal narrowing at L4-5 and L5-S1.       Narrowing of the lateral recesses at L4-5 and the right lateral recess at   L5-S1.  Disc material abuts the descending right S1 nerve root. Previous treatments: Physical Therapy and medications. .         Potential Aberrant Drug-Related Behavior:    No    Urine Drug Screening:  None    OARRS report:  2022 consistent     Opioid Agreement:  Renewal date:N/A    Past Medical History:   Diagnosis Date    Abnormal Pap smear     Anxiety     [facility]     Asthma     Chronic pain     Hx LEEP (loop electrosurgical excision procedure), cervix, pregnancy     Major depressive disorder, recurrent episode, unspecified 2011    Mental disorders of mother, antepartum 2011     Past Surgical History:   Procedure Laterality Date    BREAST SURGERY      abscess removed from left breast     SECTION      LEEP       Prior to Admission medications    Medication Sig Start Date End Date Taking?  Authorizing Provider   acetaminophen-codeine (TYLENOL/CODEINE #3) 300-30 MG per tablet Take 1 tablet by mouth 2 times daily as needed for Pain for up to 14 days. 5/13/22 5/27/22  Alison Jones MD   diclofenac (CATAFLAM) 50 MG tablet Take 1 tablet by mouth 2 times daily 4/22/21 6/21/21  Brandy Doe DPM   ARIPiprazole (ABILIFY) 2 MG tablet Take 1 tablet by mouth nightly 2/27/21   Historical Provider, MD   topiramate (TOPAMAX) 200 MG tablet Take 1 tablet by mouth daily 1/29/21   Historical Provider, MD   desvenlafaxine succinate (PRISTIQ) 50 MG TB24 extended release tablet Take 1 tablet by mouth daily 1/29/21   Historical Provider, MD   amphetamine-dextroamphetamine (ADDERALL XR) 25 MG extended release capsule Take 1 capsule by mouth 2 times daily. 1/29/21   Historical Provider, MD   traZODone (DESYREL) 150 MG tablet Take 1 tablet by mouth daily 1/29/21   Historical Provider, MD   ALPRAZolam Ya Gay) 1 MG tablet  10/19/15   Historical Provider, MD     No Known Allergies    Social History     Socioeconomic History    Marital status:      Spouse name: Not on file    Number of children: Not on file    Years of education: Not on file    Highest education level: Not on file   Occupational History    Not on file   Tobacco Use    Smoking status: Former Smoker    Smokeless tobacco: Never Used   Substance and Sexual Activity    Alcohol use: Yes     Comment: occassionaly    Drug use: No    Sexual activity: Yes     Partners: Male   Other Topics Concern    Not on file   Social History Narrative    Not on file     Social Determinants of Health     Financial Resource Strain: Low Risk     Difficulty of Paying Living Expenses: Not hard at all   Food Insecurity: No Food Insecurity    Worried About [address] in the Last Year: Never true    [address] in the Last Year: Never true   Transportation Needs:     Lack of Transportation (Medical): Not on file    Lack of Transportation (Non-Medical):  Not on file   Physical Activity:     Days of Exercise per Week: Not on file    Minutes of Exercise per Session: Not on file   Stress:     Feeling of Stress : Not on file   Social Connections:     Frequency of Communication with Friends and Family: Not on file    Frequency of Social Gatherings with Friends and Family: Not on file    Attends Protestant Services: Not on file    Active Member of [address] [place] [address] or Organizations: Not on file    Attends Club or Organization Meetings: Not on file    Marital Status: Not on file   Intimate Partner Violence:     Fear of Current or Ex-Partner: Not on file    Emotionally Abused: Not on file    Physically Abused: Not on file    Sexually Abused: Not on file   Housing Stability:     Unable to Pay for Housing in the Last Year: Not on file    Number of [place] in the Last Year: Not on file    Unstable Housing in the Last Year: Not on file     Family History   Problem Relation Age of Onset    Breast Cancer Father     Pulmonary Fibrosis Mother      REVIEW OF SYSTEMS:     Cora denies fever/chills, chest pain, shortness of breath, new bowel or bladder complaints. All other review of systems was negative. PHYSICAL EXAMINATION:      There were no vitals taken for this visit. General:       General appearance:   pleasant and well-hydrated. , in moderate discomfort and A & O x3  Build:Normal Weight     HEENT:     Head:normocephalic and atraumatic  Sclera: icterus absent,      Lungs:     Breathing:Breathing Pattern: regular, no distress     Abdomen:     Shape:non-distended and normal  Tenderness:none     Lumbar spine:     Spine inspection:normal   CVA tenderness:No   Palpation:tenderness paravertebral muscles.   Range of motion:abnormal moderately Lateral bending, flexion, extension rotation bilateral and is  painful.     Musculoskeletal:     Trigger points in Paravet[place]:absent bilaterally  SI joint tenderness:negative right, negative left  Trochanteric bursa tenderness:negative right, negative left  SLR:positive right, negative left, sitting      Extremities:     Tremors:None bilaterally upper and lower  Range of motion: pain with internal rotation of hips negative. Intact:Yes  Edema:Normal     Neurological:     Sensory:normal to light touch bilateral lower extremities  Motor:                             Right Quadriceps4-/5          Left Quadriceps5-/5           Right Gastrocnemius4-/5    Left Gastrocnemius5-/5  Right Ant Tibialis4-/5  Left Ant Tibialis5-/5  Reflexes:    Right Quadriceps reflex2+  Left Quadriceps reflex2+  Right Achilles reflex2+  Left Achilles reflex2+  Gait:antalgic     Dermatology:     Skin:no unusual rashes and no skin lesions     Impression:  Low back pain with radiation to the right lower extremity. Lumbar spine Xray Lower lumbar degenerative disc disease and facet osteoarthritis, most pronounced at L5-S1. Plan:  Follow up on her low back pain with no acute issues. Results of lumbar spine MRI were discussed with the patient. Discussed treatment options including right L4 and L5 TFESI, patient agrees. Discontinue Tylenol# 3 (didn't help). OARRS report reviewed 05/2022. Patient encouraged to stay active and to lose weight  Treatment plan discussed with the patient including procedure side effects. We discussed with the patient that combining opioids, benzodiazepines, alcohol, illicit drugs or sleep aids increases the risk of respiratory depression including death. We discussed that these medications may cause drowsiness, sedation or dizziness and have counseled the patient not to drive or operate machinery. We have discussed that these medications will be prescribed only by one provider. We have discussed with the patient about age related risk factors and have thoroughly discussed the importance of taking these medications as prescribed. The patient verbalizes understanding. ccrefjacquelyning nadeen Weaver M.D.

## 2022-05-25 NOTE — PROGRESS NOTES
Do you currently have any of the following:    Fever: No  Headache:  No  Cough: No  Shortness of breath: No  Exposed to anyone with these symptoms: No                                                                                                                Henrryar Deanna presents to the [facility] on 5/25/2022. Cora is complaining of pain in lower back and right leg. . The pain is constant. The pain is described as aching, throbbing, shooting, stabbing, sharp and burning. Pain is rated on her best day at a 10, on her worst day at a 10, and on average at a 10 on the VAS scale. She took her last dose of Tylenol with codeine , aleve, advil,alternates meds. Cora does not have issues with constipation. Any procedures since your last visit: No,     She is  on NSAIDS and  is not on anticoagulation medications to include none and is managed by NA. Pacemaker or defibrillator: No Physician managing device is NA. Medication Contract and Consent for Opioid Use Documents Filed      No documents found                   /78   Pulse (!) 113   Temp 98.7 °F (37.1 °C) (Oral)   Resp 16   Ht 5' 5\" (1.651 m)   Wt 180 lb (81.6 kg)   LMP 04/04/2022   SpO2 98%   BMI 29.95 kg/m²      Patient's last menstrual period was 04/04/2022.

## 2022-06-01 ENCOUNTER — ANESTHESIA EVENT (OUTPATIENT)
Dept: OPERATING ROOM | Age: 42
End: 2022-06-01
Payer: COMMERCIAL

## 2022-06-01 NOTE — ANESTHESIA PRE PROCEDURE
Department of Anesthesiology  Preprocedure Note       Name:  Nadia Franks   Age:  35 y.o.  :  1980                                          MRN:  [id number]         Date:  2022      Surgeon: Bunny Martinez):  Scott Soni MD    Procedure: Procedure(s):  RIGHT L4 AND L5 TRANSFORAMINAL EOIDURAL STEROID INJECTION (CPT 96361)    Medications prior to admission:   Prior to Admission medications    Medication Sig Start Date End Date Taking? Authorizing Provider   desvenlafaxine (KHEDEZLA) 100 MG TB24 ER tablet  22   Historical Provider, MD   ARIPiprazole (ABILIFY) 5 MG tablet  22   Historical Provider, MD   Naproxen Sodium (ALEVE) 220 MG CAPS Take by mouth  Patient not taking: Reported on 2022    Historical Provider, MD   ibuprofen (ADVIL;MOTRIN) 200 MG tablet Take 200 mg by mouth every 6 hours as needed for Pain    Historical Provider, MD   acetaminophen (TYLENOL) 500 MG tablet Take 500 mg by mouth every 6 hours as needed for Pain  Patient not taking: Reported on 2022    Historical Provider, MD   nabumetone (RELAFEN) 500 MG tablet Take 1 tablet by mouth 2 times daily 22  Scott Soni MD   diclofenac (CATAFLAM) 50 MG tablet Take 1 tablet by mouth 2 times daily 21  Riley Christianson DPM   topiramate (TOPAMAX) 200 MG tablet Take 1 tablet by mouth daily 21   Historical Provider, MD   amphetamine-dextroamphetamine (ADDERALL XR) 25 MG extended release capsule Take 1 capsule by mouth 2 times daily.  21   Historical Provider, MD   traZODone (DESYREL) 150 MG tablet Take 1 tablet by mouth daily 21   Historical Provider, MD   ALPRAZolam Jurline Savers) 1 MG tablet  10/19/15   Historical Provider, MD       Current medications:    Current Facility-Administered Medications   Medication Dose Route Frequency Provider Last Rate Last Admin    lactated ringers infusion   IntraVENous Continuous Thalia Junior DO        0.9 % sodium chloride infusion   IntraVENous PRN John Paul MORTON Alison Weaver MD        sodium chloride flush 0.9 % injection 5-40 mL  5-40 mL IntraVENous 2 times per day Sharath Ibarra MD        sodium chloride flush 0.9 % injection 5-40 mL  5-40 mL IntraVENous PRN Sharath Ibarra MD           Allergies:  No Known Allergies    Problem List:    Patient Active Problem List   Diagnosis Code    Previous LEEP O34.40    Major depressive disorder, recurrent episode ([facility] [address] F33.9    Opioid type dependence ([facility] [address] F11.20    Anxiety F41.9    Fibromyalgia M79.7    Abnormal thyroid blood test R79.89    Hand pain M79.643    Chronic neck pain M54.2, G89.29    Chronic back pain M54.9, G89.29    Lumbar radiculopathy M54.16    Lumbar spondylosis M47.816    Lumbar disc disorder M51.9    Lumbar facet arthropathy M47.816    Chronic pain syndrome G89.4       Past Medical History:        Diagnosis Date    Abnormal Pap smear     Anxiety     [facility]     Asthma     does not use inhalers    Chronic pain     Hx LEEP (loop electrosurgical excision procedure), cervix, pregnancy     Major depressive disorder, recurrent episode, unspecified 2011    Mental disorders of mother, antepartum 2011       Past Surgical History:        Procedure Laterality Date    BREAST SURGERY      abscess removed from left breast     SECTION      DENTAL SURGERY      couple of years ago    LEEP      TONSILLECTOMY         Social History:    Social History     Tobacco Use    Smoking status: Former Smoker    Smokeless tobacco: Never Used    Tobacco comment: quit 20 years ago   Substance Use Topics    Alcohol use: Yes     Comment: occassionaly                                Counseling given: Not Answered  Comment: quit 20 years ago      Vital Signs (Current):   Vitals:    22 1405 22 1338   BP:  125/72   Pulse:  85   Resp:  14   Temp:  98.6 °F (37 °C)   TempSrc:  Skin   SpO2:  98%   Weight: 180 lb (81.6 kg) 184 lb (83.5 kg)   Height: 5' 5\" (1.651 m) 5' 5\" (1.651 m) BP Readings from Last 3 Encounters:   06/02/22 125/72   05/25/22 122/78   05/04/22 122/80       NPO Status: Time of last liquid consumption: 2100                        Time of last solid consumption: 2100                        Date of last liquid consumption: 06/01/22                        Date of last solid food consumption: 06/01/22    BMI:   Wt Readings from Last 3 Encounters:   06/02/22 184 lb (83.5 kg)   05/25/22 180 lb (81.6 kg)   05/04/22 180 lb (81.6 kg)     Body mass index is 30.62 kg/m². CBC:   Lab Results   Component Value Date    WBC 4.2 03/07/2022    RBC 3.82 03/07/2022    HGB 12.4 03/07/2022    HCT 39.0 03/07/2022    .1 03/07/2022    RDW 12.9 03/07/2022     03/07/2022       CMP:   Lab Results   Component Value Date     03/07/2022    K 4.0 03/07/2022     03/07/2022    CO2 19 03/07/2022    BUN 18 03/07/2022    CREATININE 0.6 03/07/2022    GFRAA >60 03/07/2022    LABGLOM >60 03/07/2022    GLUCOSE 100 03/07/2022    GLUCOSE 59 12/20/2011    PROT 7.6 03/07/2022    CALCIUM 9.2 03/07/2022    BILITOT 0.3 03/07/2022    ALKPHOS 46 03/07/2022    AST 26 03/07/2022    ALT 18 03/07/2022       POC Tests: No results for input(s): POCGLU, POCNA, POCK, POCCL, POCBUN, POCHEMO, POCHCT in the last 72 hours.     Coags:   Lab Results   Component Value Date    PROTIME 11.6 10/21/2015    INR 1.1 10/21/2015    APTT 31.6 10/21/2015       HCG (If Applicable):   Lab Results   Component Value Date    PREGTESTUR neg 10/25/2021        ABGs: No results found for: PHART, PO2ART, UJI9IVB, JAJ9FMW, BEART, W0DPZHMM     Type & Screen (If Applicable):  Lab Results   Component Value Date    LABABO A 12/19/2011    [facility] POS 12/19/2011       Drug/Infectious Status (If Applicable):  No results found for: HIV, HEPCAB    COVID-19 Screening (If Applicable):   Lab Results   Component Value Date    COVID19 Not Detected 12/08/2021           Anesthesia Evaluation  Patient summary reviewed no history of anesthetic complications:   Airway: Mallampati: I  TM distance: >3 FB   Neck ROM: full  Mouth opening: > = 3 FB   Dental: normal exam         Pulmonary: breath sounds clear to auscultation  (+) asthma:     (-) not a current smoker                          ROS comment: FORMER SMOKER    Cardiovascular:Negative CV ROS            Rhythm: regular  Rate: normal           Beta Blocker:  Not on Beta Blocker         Neuro/Psych:   (+) neuromuscular disease:, psychiatric history: stable with treatmentdepression/anxiety              ROS comment: FIBROMYALGIA   GI/Hepatic/Renal: Neg GI/Hepatic/Renal ROS            Endo/Other: Negative Endo/Other ROS                    Abdominal:             Vascular: negative vascular ROS. Other Findings:           Anesthesia Plan      MAC     ASA 2       Induction: intravenous. Anesthetic plan and risks discussed with patient. Plan discussed with CRNA. PAT Chart Review:  Chart reviewed per routine on June 1, 2022 at 7:30 AM by Marivel Gaspar DO.  (Final assessment and plan per day of surgery team.)    DOS STAFF ADDENDUM:    Pt seen and examined, chart reviewed (including anesthesia, drug and allergy history). Anesthetic plan, risks, benefits, alternatives, and personnel involved discussed with patient. Patient verbalized an understanding and agrees to proceed. Plan discussed with care team members and agreed upon.     Brianna Dodd MD  Staff Anesthesiologist  1:50 PM  Brianna Dodd MD   6/2/2022

## 2022-06-02 ENCOUNTER — ANESTHESIA (OUTPATIENT)
Dept: OPERATING ROOM | Age: 42
End: 2022-06-02
Payer: COMMERCIAL

## 2022-06-02 ENCOUNTER — HOSPITAL ENCOUNTER (OUTPATIENT)
Dept: OPERATING ROOM | Age: 42
Setting detail: OUTPATIENT SURGERY
Discharge: HOME | End: 2022-06-02
Attending: PAIN MEDICINE
Payer: COMMERCIAL

## 2022-06-02 ENCOUNTER — HOSPITAL ENCOUNTER (OUTPATIENT)
Age: 42
Setting detail: OUTPATIENT SURGERY
Discharge: HOME | End: 2022-06-02
Attending: PAIN MEDICINE | Admitting: PAIN MEDICINE
Payer: COMMERCIAL

## 2022-06-02 VITALS
OXYGEN SATURATION: 100 % | RESPIRATION RATE: 14 BRPM | SYSTOLIC BLOOD PRESSURE: 122 MMHG | TEMPERATURE: 98.6 F | DIASTOLIC BLOOD PRESSURE: 84 MMHG | WEIGHT: 184 LBS | HEIGHT: 65 IN | HEART RATE: 73 BPM | BODY MASS INDEX: 30.66 KG/M2

## 2022-06-02 DIAGNOSIS — M51.9 LUMBAR DISC DISEASE: ICD-10-CM

## 2022-06-02 LAB
HCG, URINE, POC: NEGATIVE
Lab: NORMAL
NEGATIVE QC PASS/FAIL: NORMAL
POSITIVE QC PASS/FAIL: NORMAL

## 2022-06-02 PROCEDURE — 7100000011 HC PHASE II RECOVERY - ADDTL 15 MIN: Performed by: PAIN MEDICINE

## 2022-06-02 PROCEDURE — 2580000003 HC RX 258: Performed by: ANESTHESIOLOGY

## 2022-06-02 PROCEDURE — 6360000004 HC RX CONTRAST MEDICATION: Performed by: PAIN MEDICINE

## 2022-06-02 PROCEDURE — 3700000000 HC ANESTHESIA ATTENDED CARE: Performed by: PAIN MEDICINE

## 2022-06-02 PROCEDURE — 64483 NJX AA&/STRD TFRM EPI L/S 1: CPT | Performed by: PAIN MEDICINE

## 2022-06-02 PROCEDURE — 3209999900 FLUORO FOR SURGICAL PROCEDURES

## 2022-06-02 PROCEDURE — 6360000002 HC RX W HCPCS: Performed by: NURSE ANESTHETIST, CERTIFIED REGISTERED

## 2022-06-02 PROCEDURE — 2709999900 HC NON-CHARGEABLE SUPPLY: Performed by: PAIN MEDICINE

## 2022-06-02 PROCEDURE — 64484 NJX AA&/STRD TFRM EPI L/S EA: CPT | Performed by: PAIN MEDICINE

## 2022-06-02 PROCEDURE — 7100000010 HC PHASE II RECOVERY - FIRST 15 MIN: Performed by: PAIN MEDICINE

## 2022-06-02 PROCEDURE — 2500000003 HC RX 250 WO HCPCS: Performed by: PAIN MEDICINE

## 2022-06-02 PROCEDURE — 6360000002 HC RX W HCPCS: Performed by: PAIN MEDICINE

## 2022-06-02 PROCEDURE — 3600000005 HC SURGERY LEVEL 5 BASE: Performed by: PAIN MEDICINE

## 2022-06-02 RX ORDER — SODIUM CHLORIDE 0.9 % (FLUSH) 0.9 %
5-40 SYRINGE (ML) INJECTION EVERY 12 HOURS SCHEDULED
Status: DISCONTINUED | OUTPATIENT
Start: 2022-06-02 | End: 2022-06-02 | Stop reason: HOSPADM

## 2022-06-02 RX ORDER — SODIUM CHLORIDE 9 MG/ML
INJECTION, SOLUTION INTRAVENOUS PRN
Status: DISCONTINUED | OUTPATIENT
Start: 2022-06-02 | End: 2022-06-02 | Stop reason: HOSPADM

## 2022-06-02 RX ORDER — SODIUM CHLORIDE 0.9 % (FLUSH) 0.9 %
5-40 SYRINGE (ML) INJECTION PRN
Status: DISCONTINUED | OUTPATIENT
Start: 2022-06-02 | End: 2022-06-02 | Stop reason: HOSPADM

## 2022-06-02 RX ORDER — MIDAZOLAM HYDROCHLORIDE 1 MG/ML
INJECTION INTRAMUSCULAR; INTRAVENOUS PRN
Status: DISCONTINUED | OUTPATIENT
Start: 2022-06-02 | End: 2022-06-02 | Stop reason: SDUPTHER

## 2022-06-02 RX ORDER — LIDOCAINE HYDROCHLORIDE 5 MG/ML
INJECTION, SOLUTION INFILTRATION; INTRAVENOUS PRN
Status: DISCONTINUED | OUTPATIENT
Start: 2022-06-02 | End: 2022-06-02 | Stop reason: ALTCHOICE

## 2022-06-02 RX ORDER — SODIUM CHLORIDE, SODIUM LACTATE, POTASSIUM CHLORIDE, CALCIUM CHLORIDE 600; 310; 30; 20 MG/100ML; MG/100ML; MG/100ML; MG/100ML
INJECTION, SOLUTION INTRAVENOUS CONTINUOUS
Status: DISCONTINUED | OUTPATIENT
Start: 2022-06-02 | End: 2022-06-02 | Stop reason: HOSPADM

## 2022-06-02 RX ORDER — FENTANYL CITRATE 50 UG/ML
INJECTION, SOLUTION INTRAMUSCULAR; INTRAVENOUS PRN
Status: DISCONTINUED | OUTPATIENT
Start: 2022-06-02 | End: 2022-06-02 | Stop reason: SDUPTHER

## 2022-06-02 RX ADMIN — MIDAZOLAM 2 MG: 1 INJECTION INTRAMUSCULAR; INTRAVENOUS at 14:33

## 2022-06-02 RX ADMIN — FENTANYL CITRATE 50 MCG: 50 INJECTION INTRAMUSCULAR; INTRAVENOUS at 14:36

## 2022-06-02 RX ADMIN — SODIUM CHLORIDE, POTASSIUM CHLORIDE, SODIUM LACTATE AND CALCIUM CHLORIDE: 600; 310; 30; 20 INJECTION, SOLUTION INTRAVENOUS at 13:50

## 2022-06-02 RX ADMIN — FENTANYL CITRATE 50 MCG: 50 INJECTION INTRAMUSCULAR; INTRAVENOUS at 14:34

## 2022-06-02 ASSESSMENT — PAIN SCALES - GENERAL
PAINLEVEL_OUTOF10: 6
PAINLEVEL_OUTOF10: 5

## 2022-06-02 ASSESSMENT — PAIN DESCRIPTION - FREQUENCY
FREQUENCY: INTERMITTENT
FREQUENCY: CONTINUOUS

## 2022-06-02 ASSESSMENT — PAIN DESCRIPTION - LOCATION
LOCATION: ANKLE
LOCATION: ANKLE;FOOT

## 2022-06-02 ASSESSMENT — PAIN DESCRIPTION - ORIENTATION
ORIENTATION: RIGHT
ORIENTATION: RIGHT

## 2022-06-02 ASSESSMENT — PAIN DESCRIPTION - PAIN TYPE
TYPE: SURGICAL PAIN;CHRONIC PAIN
TYPE: CHRONIC PAIN;SURGICAL PAIN

## 2022-06-02 ASSESSMENT — PAIN DESCRIPTION - DESCRIPTORS
DESCRIPTORS: OTHER (COMMENT)
DESCRIPTORS: CRAMPING
DESCRIPTORS: ACHING

## 2022-06-02 ASSESSMENT — PAIN DESCRIPTION - ONSET
ONSET: ON-GOING
ONSET: ON-GOING

## 2022-06-02 ASSESSMENT — PAIN - FUNCTIONAL ASSESSMENT: PAIN_FUNCTIONAL_ASSESSMENT: 0-10

## 2022-06-02 ASSESSMENT — LIFESTYLE VARIABLES: SMOKING_STATUS: 0

## 2022-06-02 NOTE — H&P
[facility]  [address] [facility], [address] [place] [address]  [phone]    Procedure History & Physical      Eliazar Seal     HPI:    Patient  is here for low back and right lower extremity pain for Right L4 and L5 TFESI  Labs/imaging studies reviewed   All question and concerns addressed including R/B/A associated with the procedure    Past Medical History:   Diagnosis Date    Abnormal Pap smear     Anxiety     [facility]     Asthma     does not use inhalers    Chronic pain     Hx LEEP (loop electrosurgical excision procedure), cervix, pregnancy     Major depressive disorder, recurrent episode, unspecified 2011    Mental disorders of mother, antepartum 2011       Past Surgical History:   Procedure Laterality Date    BREAST SURGERY      abscess removed from left breast     SECTION      DENTAL SURGERY      couple of years ago    LEEP      TONSILLECTOMY         Prior to Admission medications    Medication Sig Start Date End Date Taking?  Authorizing Provider   desvenlafaxine (KHEDEZLA) 100 MG TB24 ER tablet  22   Historical Provider, MD   ARIPiprazole (ABILIFY) 5 MG tablet  22   Historical Provider, MD   Naproxen Sodium (ALEVE) 220 MG CAPS Take by mouth  Patient not taking: Reported on 2022    Historical Provider, MD   ibuprofen (ADVIL;MOTRIN) 200 MG tablet Take 200 mg by mouth every 6 hours as needed for Pain    Historical Provider, MD   acetaminophen (TYLENOL) 500 MG tablet Take 500 mg by mouth every 6 hours as needed for Pain  Patient not taking: Reported on 2022    Historical Provider, MD   nabumetone (RELAFEN) 500 MG tablet Take 1 tablet by mouth 2 times daily 22  Agnes Aguilera MD   diclofenac (CATAFLAM) 50 MG tablet Take 1 tablet by mouth 2 times daily 21  Ilana Hill DPM   topiramate (TOPAMAX) 200 MG tablet Take 1 tablet by mouth daily 21   Historical Provider, MD amphetamine-dextroamphetamine (ADDERALL XR) 25 MG extended release capsule Take 1 capsule by mouth 2 times daily. 1/29/21   Historical Provider, MD   traZODone (DESYREL) 150 MG tablet Take 1 tablet by mouth daily 1/29/21   Historical Provider, MD Tawny Parrish) 1 MG tablet  10/19/15   Historical Provider, MD       No Known Allergies    Social History     Socioeconomic History    Marital status:      Spouse name: Not on file    Number of children: Not on file    Years of education: Not on file    Highest education level: Not on file   Occupational History    Not on file   Tobacco Use    Smoking status: Former Smoker    Smokeless tobacco: Never Used    Tobacco comment: quit 20 years ago   Vaping Use    Vaping Use: Never used   Substance and Sexual Activity    Alcohol use: Yes     Comment: occassionaly    Drug use: No    Sexual activity: Yes     Partners: Male   Other Topics Concern    Not on file   Social History Narrative    Not on file     Social Determinants of Health     Financial Resource Strain: Low Risk     Difficulty of Paying Living Expenses: Not hard at all   Food Insecurity: No Food Insecurity    Worried About [address] in the Last Year: Never true    [facility] in the Last Year: Never true   Transportation Needs:     Lack of Transportation (Medical): Not on file    Lack of Transportation (Non-Medical):  Not on file   Physical Activity:     Days of Exercise per Week: Not on file    Minutes of Exercise per Session: Not on file   Stress:     Feeling of Stress : Not on file   Social Connections:     Frequency of Communication with Friends and Family: Not on file    Frequency of Social Gatherings with Friends and Family: Not on file    Attends Congregation Services: Not on file    Active Member of Clubs or Organizations: Not on file    Attends Club or Organization Meetings: Not on file    Marital Status: Not on file   Intimate Partner Violence:     Fear of Current or Ex-Partner: Not on file    Emotionally Abused: Not on file    Physically Abused: Not on file    Sexually Abused: Not on file   Housing Stability:     Unable to Pay for Housing in the Last Year: Not on file    Number of Places Lived in the Last Year: Not on file    Unstable Housing in the Last Year: Not on file       Family History   Problem Relation Age of Onset    Breast Cancer Father     Pulmonary Fibrosis Mother          REVIEW OF SYSTEMS:    CONSTITUTIONAL:  negative for  fevers, chills, sweats and fatigue    RESPIRATORY:  negative for  dry cough, cough with sputum, dyspnea, wheezing and chest pain    CARDIOVASCULAR:  negative for chest pain, dyspnea, palpitations, syncope    GASTROINTESTINAL:  negative for nausea, vomiting, change in bowel habits, diarrhea, constipation and abdominal pain    MUSCULOSKELETAL: negative for muscle weakness    SKIN: negative for itching or rashes. BEHAVIOR/PSYCH:  negative for poor appetite, increased appetite, decreased sleep and poor concentration    All other systems negative      PHYSICAL EXAM:    VITALS:  /72   Pulse 85   Temp 98.6 °F (37 °C) (Skin)   Resp 14   Ht 5' 5\" (1.651 m)   Wt 184 lb (83.5 kg)   LMP 05/04/2022   SpO2 98%   BMI 30.62 kg/m²     CONSTITUTIONAL:  awake, alert, cooperative, no apparent distress, and appears stated age    EYES: PERRLA, EOMI    LUNGS:  No increased work of breathing, no audible wheezing    CARDIOVASCULAR:  regular rate and rhythm    ABDOMEN:  Soft non tender non distended     EXTREMITIES: no signs of clubbing or cyanosis. MUSCULOSKELETAL: negative for flaccid muscle tone or spastic movements. SKIN: gross examination reveals no signs of rashes, or diaphoresis. NEURO: Cranial nerves II-XII grossly intact. No signs of agitated mood. Assessment/Plan:    Low back and right lower extremity pain for right L4 and L5 TFESI.

## 2022-06-02 NOTE — ANESTHESIA POSTPROCEDURE EVALUATION
Department of Anesthesiology  Postprocedure Note    Patient: Fausto Li  MRN: [id number]  YOB: 1980  Date of evaluation: 6/2/2022  Time:  3:26 PM     Procedure Summary     Date: 06/02/22 Room / Location: [address] [place] [address] / [address] [place] [address]    Anesthesia Start: 5536 Anesthesia Stop: 2785    Procedure: RIGHT L4 AND L5 TRANSFORAMINAL EOIDURAL STEROID INJECTION (CPT 93791) (Right ) Diagnosis:       Lumbar radiculopathy      (Lumbar radiculopathy [M54.16])    Surgeons: Tevin Montano MD Responsible Provider: Abram Gallardo MD    Anesthesia Type: MAC ASA Status: 2          Anesthesia Type: MAC    Jadon Phase I: Jadon Score: 10    Jadon Phase II: Jadon Score: 10    Last vitals: Reviewed and per EMR flowsheets.        Anesthesia Post Evaluation    Patient location during evaluation: PACU  Patient participation: complete - patient participated  Level of consciousness: awake  Airway patency: patent  Nausea & Vomiting: no nausea and no vomiting  Complications: no  Cardiovascular status: hemodynamically stable  Respiratory status: acceptable  Hydration status: euvolemic

## 2022-06-02 NOTE — OP NOTE
2022    Patient: Fausto Li  :  1980  Age:  35 y.o. Sex:  female     PRE-OPERATIVE DIAGNOSIS: Lumbar disc displacement, lumbar neural foraminal stenosis, lumbar radiculopathy. POST-OPERATIVE DIAGNOSIS: Same. PROCEDURE: Right Transforaminal epidural steroid injection under fluoroscopic guidance at foraminal level L4 and L5 TFESI (#1). SURGEON: JONO Tavarez M.D. ANESTHESIA: MAC    ESTIMATED BLOOD LOSS: None.  ______________________________________________________________________  BRIEF HISTORY: Fausto Li comes in today for the first Right transforaminal epidural steroid injection under fluoroscopic guidance at foraminal level L4 and L5 . The potential complications of this procedure were discussed with her again today. She has elected to undergo the aforementioned procedure. Mandie complete History & Physical examination were reviewed in depth, a copy of which is in the chart. DESCRIPTION OF PROCEDURE:    After confirming written and informed consent, a time-out was performed and Mandie name and date of birth, the procedure to be performed as well as the plan for the location of the needle insertion were confirmed. The patient was brought into the procedure room and placed in the prone position on the fluoroscopy table. Standard monitors were placed and vital signs were observed throughout the procedure. The area of the lumbar spine was prepped with chloraprep and draped in a sterile manner. The vertebral body was identified with AP fluoroscopy. An oblique view was obtained to better visualize the inferior junction of the pedicle and transverse process . The 6 o'clock position of the pedicle was marked and identified. The skin and subcutaneous tissue were anesthetized with 0.5% lidocaine. A # 22 gauge pencil point needle was directed toward the targeted point under fluoroscopy until bone was contacted.  The needle was then walked inferiorly until the neural foramen was entered . A lateral fluoroscopic view was then used to place the needle tip in the middle of the foramen. Negative aspiration was confirmed for blood and CSF and 0.5 cc of Omnipaque 240 contrast was injected at each level under live fluoroscopy. Appropriate neurograms were observed under AP fluoroscopy. Then after negative aspiration, a solution of the 2 cc of 0.5% lidocaine and 40 mg DepoMedrol was easily injected at each level. The needles were removed with constant aspiration technique. The patient back was cleaned and a bandage was placed over the needle insertion points    Disposition the patient tolerated the procedure well and there were no complications . Vital signs remained stable throughout the procedure. The patient was escorted to the recovery area where they remained until discharge and written discharge instructions for the procedure were given. Plan: Froilan Purvis will return to our pain management center as scheduled.      Christel Espino MD

## 2022-06-09 ENCOUNTER — OFFICE VISIT (OUTPATIENT)
Dept: PAIN MANAGEMENT | Age: 42
End: 2022-06-09
Payer: COMMERCIAL

## 2022-06-09 ENCOUNTER — TELEPHONE (OUTPATIENT)
Dept: PAIN MANAGEMENT | Age: 42
End: 2022-06-09

## 2022-06-09 VITALS — BODY MASS INDEX: 30.66 KG/M2 | HEIGHT: 65 IN | WEIGHT: 184 LBS | RESPIRATION RATE: 16 BRPM | TEMPERATURE: 96.9 F

## 2022-06-09 DIAGNOSIS — M47.816 LUMBAR FACET ARTHROPATHY: ICD-10-CM

## 2022-06-09 DIAGNOSIS — G89.4 CHRONIC PAIN SYNDROME: ICD-10-CM

## 2022-06-09 DIAGNOSIS — M54.16 LUMBAR RADICULOPATHY: Primary | ICD-10-CM

## 2022-06-09 DIAGNOSIS — M47.816 LUMBAR SPONDYLOSIS: ICD-10-CM

## 2022-06-09 DIAGNOSIS — M51.9 LUMBAR DISC DISORDER: ICD-10-CM

## 2022-06-09 DIAGNOSIS — M25.551 PAIN IN RIGHT HIP: ICD-10-CM

## 2022-06-09 PROCEDURE — 99213 OFFICE O/P EST LOW 20 MIN: CPT | Performed by: PAIN MEDICINE

## 2022-06-09 PROCEDURE — 99214 OFFICE O/P EST MOD 30 MIN: CPT | Performed by: PAIN MEDICINE

## 2022-06-09 PROCEDURE — G8417 CALC BMI ABV UP PARAM F/U: HCPCS | Performed by: PAIN MEDICINE

## 2022-06-09 PROCEDURE — G8427 DOCREV CUR MEDS BY ELIG CLIN: HCPCS | Performed by: PAIN MEDICINE

## 2022-06-09 PROCEDURE — 99213 OFFICE O/P EST LOW 20 MIN: CPT

## 2022-06-09 PROCEDURE — 1036F TOBACCO NON-USER: CPT | Performed by: PAIN MEDICINE

## 2022-06-09 RX ORDER — HYDROCODONE BITARTRATE AND ACETAMINOPHEN 5; 325 MG/1; MG/1
1 TABLET ORAL DAILY PRN
Qty: 30 TABLET | Refills: 0 | Status: SHIPPED | OUTPATIENT
Start: 2022-06-09 | End: 2022-07-09

## 2022-06-09 RX ORDER — CYCLOBENZAPRINE HCL 5 MG
5 TABLET ORAL 2 TIMES DAILY PRN
Qty: 60 TABLET | Refills: 0 | Status: SHIPPED | OUTPATIENT
Start: 2022-06-09 | End: 2022-07-09

## 2022-06-09 NOTE — TELEPHONE ENCOUNTER
Patient calling in stating she is in severe pain, had epidural on 6/02, she has had no relief, whole right side is affected, she feels that if she continues to walk she is going to become paralyzed because her muscles are spasming and feels like her body is locking up. She is taking a large amount of ibuprofen. She is scheduled for a follow up on 6/22 but wants to know if there is anything she can do before then.

## 2022-06-09 NOTE — PROGRESS NOTES
[address] [facility], [address] [place] [address] Isaias  [phone]    Follow up Note      Alise Hu     Date of Visit:  6/9/2022    CC:  Patient presents for follow up   Chief Complaint   Patient presents with    Follow Up After Procedure     Right Transforaminal epidural steroid injection under fluoroscopic guidance at foraminal level L4 and L5 TFESI (#1).  Back Pain     lower back and down the right leg     HPI:    Pain is unchanged. Appropriate analgesia with current medications regimen: No.    Change in quality of symptoms:no. Medication side effects:none. Recent diagnostic testing:none  Recent interventional procedures:Right L4 and L5 TFESI poor    She has not been on anticoagulation medications to include none. The patient  has not been on herbal supplements. The patient is not diabetic. Imaging:  Lumbar spine Xray 2021:  Lower lumbar degenerative disc disease and facet osteoarthritis, most   pronounced at L5-S1. Lumbar spine MRI 2022  Degenerative change most pronounced in the lower lumbar spine with mild   bilateral foraminal narrowing at L4-5 and L5-S1.       Narrowing of the lateral recesses at L4-5 and the right lateral recess at   L5-S1.  Disc material abuts the descending right S1 nerve root. Right hip Xray normal    Previous treatments: Physical Therapy and medications. .         Potential Aberrant Drug-Related Behavior:    No    Urine Drug Screening:  None    OARRS report:  06/2022 consistent     Opioid Agreement:  Renewal date:N/A    Past Medical History:   Diagnosis Date    Abnormal Pap smear     Anxiety     [facility]     Asthma     does not use inhalers    Chronic pain     Hx LEEP (loop electrosurgical excision procedure), cervix, pregnancy 2008    Major depressive disorder, recurrent episode, unspecified 8/5/2011    Mental disorders of mother, antepartum 8/5/2011     Past Surgical History:   Procedure Laterality Date  BREAST SURGERY      abscess removed from left breast     SECTION      DENTAL SURGERY      couple of years ago    LEEP      NERVE BLOCK Right 2022    RIGHT L4 AND L5 TRANSFORAMINAL EOIDURAL STEROID INJECTION (CPT 44021) performed by Carol Domínguez MD at [facility]       Prior to Admission medications    Medication Sig Start Date End Date Taking? Authorizing Provider   desvenlafaxine (KHEDEZLA) 100 MG TB24 ER tablet  22  Yes Historical Provider, MD   ARIPiprazole (ABILIFY) 5 MG tablet  22  Yes Historical Provider, MD   Naproxen Sodium (ALEVE) 220 MG CAPS Take by mouth    Yes Historical Provider, MD   ibuprofen (ADVIL;MOTRIN) 200 MG tablet Take 200 mg by mouth every 6 hours as needed for Pain   Yes Historical Provider, MD   acetaminophen (TYLENOL) 500 MG tablet Take 500 mg by mouth every 6 hours as needed for Pain    Yes Historical Provider, MD   nabumetone (RELAFEN) 500 MG tablet Take 1 tablet by mouth 2 times daily 22 Yes Carol Domínguez MD   topiramate (TOPAMAX) 200 MG tablet Take 1 tablet by mouth daily 21  Yes Historical Provider, MD   amphetamine-dextroamphetamine (ADDERALL XR) 25 MG extended release capsule Take 1 capsule by mouth 2 times daily.  21  Yes Historical Provider, MD   traZODone (DESYREL) 150 MG tablet Take 1 tablet by mouth daily 21  Yes Historical Provider, MD   ALPRAZolam Alvia Mouna) 1 MG tablet  10/19/15  Yes Historical Provider, MD   diclofenac (CATAFLAM) 50 MG tablet Take 1 tablet by mouth 2 times daily 21  Chidi Doss DPM     No Known Allergies    Social History     Socioeconomic History    Marital status:      Spouse name: Not on file    Number of children: Not on file    Years of education: Not on file    Highest education level: Not on file   Occupational History    Not on file   Tobacco Use    Smoking status: Former Smoker    Smokeless tobacco: Never Used    Tobacco comment: quit 20 years ago   Vaping Use    Vaping Use: Never used   Substance and Sexual Activity    Alcohol use: Yes     Comment: occassionaly    Drug use: No    Sexual activity: Yes     Partners: Male   Other Topics Concern    Not on file   Social History Narrative    Not on file     Social Determinants of Health     Financial Resource Strain: Low Risk     Difficulty of Paying Living Expenses: Not hard at all   Food Insecurity: No Food Insecurity    Worried About [address] in the Last Year: Never true    [address] [facility] in the Last Year: Never true   Transportation Needs:     Lack of Transportation (Medical): Not on file    Lack of Transportation (Non-Medical): Not on file   Physical Activity:     Days of Exercise per Week: Not on file    Minutes of Exercise per Session: Not on file   Stress:     Feeling of Stress : Not on file   Social Connections:     Frequency of Communication with Friends and Family: Not on file    Frequency of Social Gatherings with Friends and Family: Not on file    Attends [employer] Services: Not on file    Active Member of [address] [place] [address] or Organizations: Not on file    Attends Club or Organization Meetings: Not on file    Marital Status: Not on file   Intimate Partner Violence:     Fear of Current or Ex-Partner: Not on file    Emotionally Abused: Not on file    Physically Abused: Not on file    Sexually Abused: Not on file   Housing Stability:     Unable to Pay for Housing in the Last Year: Not on file    Number of [place] in the Last Year: Not on file    Unstable Housing in the Last Year: Not on file     Family History   Problem Relation Age of Onset    Breast Cancer Father     Pulmonary Fibrosis Mother      REVIEW OF SYSTEMS:     Cora denies fever/chills, chest pain, shortness of breath, new bowel or bladder complaints. All other review of systems was negative.     PHYSICAL EXAMINATION:      Temp 96.9 °F (36.1 °C) (Infrared)   Resp 16   Ht 5' 5\" (1.651 m)   Wt 184 lb (83.5 kg)   BMI 30.62 kg/m²     General:       General appearance:   pleasant and well-hydrated. , in moderate discomfort and A & O x3  Build:Normal Weight     HEENT:     Head:normocephalic and atraumatic  Sclera: icterus absent,      Lungs:     Breathing:Breathing Pattern: regular, no distress     Abdomen:     Shape:non-distended and normal  Tenderness:none     Lumbar spine:     Spine inspection:normal   CVA tenderness:No   Palpation:tenderness paravertebral muscles. Range of motion:abnormal moderately Lateral bending, flexion, extension rotation bilateral and is  painful.     Musculoskeletal:     Trigger points in [place]:absent bilaterally  SI joint tenderness:negative right, negative left  Trochanteric bursa tenderness:negative right, negative left  SLR:positive right, negative left, sitting      Extremities:     Tremors:None bilaterally upper and lower  Range of motion: pain with internal rotation of hips positive right  Intact:Yes  Edema:Normal     Neurological:     Sensory:normal to light touch bilateral lower extremities  Motor:                             Right Quadriceps4-/5          Left Quadriceps5-/5           Right Gastrocnemius4-/5    Left Gastrocnemius5-/5  Right Ant Tibialis4-/5  Left Ant Tibialis5-/5  Reflexes:    Right Quadriceps reflex2+  Left Quadriceps reflex2+  Right Achilles reflex2+  Left Achilles reflex2+  Gait:antalgic     Dermatology:     Skin:no unusual rashes and no skin lesions     Impression:  Low back pain with radiation to the right lower extremity. Lumbar spine Xray Lower lumbar degenerative disc disease and facet osteoarthritis, most pronounced at L5-S1. Tried and failed Gabapentin in the past  Plan:  Same day visit for worsening low back pain with radiation to the right lower extremity. Patient is s/p Right L4 and L5 TFESI with less than expected response. Discussed second opinion neurosurgery, patient agrees.   Will start patient on Flexeril 5 mg BID, discussed side effects. Will start patient on Norco 5/325 QD PRN, discussed side effects. Will submit for record release of her EMG. OARRS report reviewed 06/2022. Patient encouraged to stay active and to lose weight  Treatment plan discussed with the patient including medications side effects. We discussed with the patient that combining opioids, benzodiazepines, alcohol, illicit drugs or sleep aids increases the risk of respiratory depression including death. We discussed that these medications may cause drowsiness, sedation or dizziness and have counseled the patient not to drive or operate machinery. We have discussed that these medications will be prescribed only by one provider. We have discussed with the patient about age related risk factors and have thoroughly discussed the importance of taking these medications as prescribed. The patient verbalizes understanding. lev Bae M.D.

## 2022-06-09 NOTE — PROGRESS NOTES
Do you currently have any of the following:    Fever: No  Headache:  No  Cough: No  Shortness of breath: No  Exposed to anyone with these symptoms: No                                                                                                                Ebonie Hof presents to the [facility] on 6/9/2022. Cora is complaining of pain lower back and down the right leg. The pain is constant. The pain is described as aching, throbbing, stabbing, sharp, burning, miserable and unbearable. Pain is rated on her best day at a 10, on her worst day at a 10, and on average at a 10 on the VAS scale. She took her last dose of Motrin, Tylenol, Relafen and otc patches . Cora does not have issues with constipation. Any procedures since your last visit: Yes,       She is  on NSAIDS and  is not on anticoagulation medications to include none   Pacemaker or defibrillator: No .    Medication Contract and Consent for Opioid Use Documents Filed      No documents found                   Temp 96.9 °F (36.1 °C) (Infrared)   Resp 16   Ht 5' 5\" (1.651 m)   Wt 184 lb (83.5 kg)   BMI 30.62 kg/m²      No LMP recorded.

## 2022-06-14 ENCOUNTER — TELEPHONE (OUTPATIENT)
Dept: PRIMARY CARE CLINIC | Age: 42
End: 2022-06-14

## 2022-06-14 NOTE — TELEPHONE ENCOUNTER
Patient requesting referral for pain management at [facility]. Has gotten epidural injections with Dr. Lewis Mims and was told the next step would be surgery, but patient wanting second opinion before moving forward.

## 2022-06-15 DIAGNOSIS — G89.29 CHRONIC RIGHT-SIDED LOW BACK PAIN WITH RIGHT-SIDED SCIATICA: Primary | ICD-10-CM

## 2022-06-15 DIAGNOSIS — M54.41 CHRONIC RIGHT-SIDED LOW BACK PAIN WITH RIGHT-SIDED SCIATICA: Primary | ICD-10-CM

## 2022-06-15 NOTE — TELEPHONE ENCOUNTER
Patient notified that she will be contacted for an appointment by [facility]

## 2022-06-23 ENCOUNTER — TELEPHONE (OUTPATIENT)
Dept: PAIN MANAGEMENT | Age: 42
End: 2022-06-23

## 2022-06-23 NOTE — TELEPHONE ENCOUNTER
Please advised patient that I won't be able to give her an early refill.   Pharmacy won't fill it   Thank you

## 2022-06-23 NOTE — TELEPHONE ENCOUNTER
Patient was prescribed Norco at visit on 6/09 with the instructions of 1 tablet once a day as needed for 30 days, per OARRS medication was filled on 6/09. Patient called in today asking for a refill of [place] because she had to take 2 a day to get any relief from pain and stiffness. I contacted patient, she is completely out of medication as of today.        Next appointment 7/13/22

## 2022-07-13 ENCOUNTER — TELEPHONE (OUTPATIENT)
Dept: PRIMARY CARE CLINIC | Age: 42
End: 2022-07-13

## 2022-07-13 NOTE — TELEPHONE ENCOUNTER
----- Message from Iain Karimi sent at 7/13/2022  9:45 AM EDT -----  Subject: Message to Provider    QUESTIONS  Information for Provider? Pt is requesting for her nerve and xray's test   results to be faxed to [phone] Attn? Dr. Ilana Gamino  ---------------------------------------------------------------------------  --------------  [address] [employer]  [phone]; OK to leave message on voicemail  ---------------------------------------------------------------------------  --------------  SCRIPT ANSWERS  Relationship to Patient?  Self

## 2022-08-08 ENCOUNTER — TELEPHONE (OUTPATIENT)
Dept: PRIMARY CARE CLINIC | Age: 42
End: 2022-08-08

## 2022-08-08 NOTE — TELEPHONE ENCOUNTER
LM for patient to contact office to schedule pre-op appointment. Procedure is 8/26/22. Testing must be completed within 30 days of appointment. Last pcp visit was in March.

## 2022-08-10 ENCOUNTER — OFFICE VISIT (OUTPATIENT)
Dept: FAMILY MEDICINE CLINIC | Age: 42
End: 2022-08-10
Payer: COMMERCIAL

## 2022-08-10 VITALS
HEART RATE: 98 BPM | HEIGHT: 65 IN | WEIGHT: 194 LBS | BODY MASS INDEX: 32.32 KG/M2 | OXYGEN SATURATION: 98 % | TEMPERATURE: 97.5 F | DIASTOLIC BLOOD PRESSURE: 92 MMHG | RESPIRATION RATE: 18 BRPM | SYSTOLIC BLOOD PRESSURE: 132 MMHG

## 2022-08-10 DIAGNOSIS — K02.9 DENTAL CARIES: Primary | ICD-10-CM

## 2022-08-10 PROCEDURE — 99213 OFFICE O/P EST LOW 20 MIN: CPT | Performed by: PHYSICIAN ASSISTANT

## 2022-08-10 PROCEDURE — G8417 CALC BMI ABV UP PARAM F/U: HCPCS | Performed by: PHYSICIAN ASSISTANT

## 2022-08-10 PROCEDURE — G8427 DOCREV CUR MEDS BY ELIG CLIN: HCPCS | Performed by: PHYSICIAN ASSISTANT

## 2022-08-10 PROCEDURE — 1036F TOBACCO NON-USER: CPT | Performed by: PHYSICIAN ASSISTANT

## 2022-08-10 RX ORDER — AMOXICILLIN AND CLAVULANATE POTASSIUM 875; 125 MG/1; MG/1
1 TABLET, FILM COATED ORAL 2 TIMES DAILY
Qty: 20 TABLET | Refills: 0 | Status: SHIPPED | OUTPATIENT
Start: 2022-08-10 | End: 2022-08-20

## 2022-08-10 NOTE — PROGRESS NOTES
8/10/22  Kasandra Robledo : 1980 Sex: female  Age 35 y.o. Subjective:  Chief Complaint   Patient presents with    Otalgia     Left    Dental Injury     Chipped tooth         HPI:   Kasandra Robledo , 35 y.o. female presents to express care for evaluation of left ear pain, chipped tooth, dental pain    HPI  53-year-old female presents to express care for evaluation of dental pain. The patient has had this dental pain ongoing for couple of weeks. Seem to be getting worse. The patient has been having parts of the tooth that are chipping off to the left mandibular posterior molar. The patient states that the pain has been becoming worse. The patient noticed that the left ear is bothering her. The patient states that she does have a dentist and sees [facility].  The patient is not having any facial swelling. The patient is able to open and close her mouth. No fever, chills. ROS:   Unless otherwise stated in this report the patient's positive and negative responses for review of systems for constitutional, eyes, ENT, cardiovascular, respiratory, gastrointestinal, neurological, , musculoskeletal, and integument systems and related systems to the presenting problem are either stated in the history of present illness or were not pertinent or were negative for the symptoms and/or complaints related to the presenting medical problem. Positives and pertinent negatives as per HPI. All others reviewed and are negative.       PMH:     Past Medical History:   Diagnosis Date    Abnormal Pap smear     Anxiety     [facility] Psychiatry     Asthma     does not use inhalers    Chronic pain     Hx LEEP (loop electrosurgical excision procedure), cervix, pregnancy     Major depressive disorder, recurrent episode, unspecified 2011    Mental disorders of mother, antepartum 2011       Past Surgical History:   Procedure Laterality Date    BREAST SURGERY      abscess removed from left breast  SECTION      DENTAL SURGERY      couple of years ago    LEEP      NERVE BLOCK Right 2022    RIGHT L4 AND L5 TRANSFORAMINAL EOIDURAL STEROID INJECTION (CPT 59731) performed by Laura Patel MD at [facility] 19         Family History   Problem Relation Age of Onset    Breast Cancer Father     Pulmonary Fibrosis Mother        Medications:     Current Outpatient Medications:     amoxicillin-clavulanate (AUGMENTIN) 875-125 MG per tablet, Take 1 tablet by mouth in the morning and 1 tablet before bedtime. Do all this for 10 days. , Disp: 20 tablet, Rfl: 0    desvenlafaxine (KHEDEZLA) 100 MG TB24 ER tablet, , Disp: , Rfl:     ARIPiprazole (ABILIFY) 5 MG tablet, , Disp: , Rfl:     Naproxen Sodium (ALEVE) 220 MG CAPS, Take by mouth , Disp: , Rfl:     ibuprofen (ADVIL;MOTRIN) 200 MG tablet, Take 200 mg by mouth every 6 hours as needed for Pain, Disp: , Rfl:     acetaminophen (TYLENOL) 500 MG tablet, Take 500 mg by mouth every 6 hours as needed for Pain , Disp: , Rfl:     nabumetone (RELAFEN) 500 MG tablet, Take 1 tablet by mouth 2 times daily, Disp: 60 tablet, Rfl: 0    diclofenac (CATAFLAM) 50 MG tablet, Take 1 tablet by mouth 2 times daily, Disp: 90 tablet, Rfl: 0    topiramate (TOPAMAX) 200 MG tablet, Take 1 tablet by mouth daily, Disp: , Rfl:     amphetamine-dextroamphetamine (ADDERALL XR) 25 MG extended release capsule, Take 1 capsule by mouth 2 times daily. , Disp: , Rfl:     traZODone (DESYREL) 150 MG tablet, Take 1 tablet by mouth daily, Disp: , Rfl:     ALPRAZolam (XANAX) 1 MG tablet, , Disp: , Rfl:     Allergies:   No Known Allergies    Social History:     Social History     Tobacco Use    Smoking status: Former    Smokeless tobacco: Never    Tobacco comments:     quit 20 years ago   Vaping Use    Vaping Use: Never used   Substance Use Topics    Alcohol use: Yes     Comment: occassionaly    Drug use: No       Patient lives at home.     Physical Exam:     Vitals:    08/10/22 1435   BP: (!) 132/92   Site: Right Upper Arm   Position: Sitting   Cuff Size: Medium Adult   Pulse: 98   Resp: 18   Temp: 97.5 °F (36.4 °C)   TempSrc: Temporal   SpO2: 98%   Weight: 194 lb (88 kg)   Height: 5' 5\" (1.651 m)       Exam:  Physical Exam  Nurse's notes and vital signs reviewed. The patient is not hypoxic. General: Alert, no acute distress, patient resting comfortably Patient is not toxic or lethargic. Skin: Warm, intact, no pallor noted. There is no evidence of rash at this time. Head: Normocephalic, atraumatic. Eye: Normal conjunctiva  Ears, Nose, Throat: Right tympanic membrane clear, left tympanic membrane clear. No drainage or discharge noted. No pre- or post-auricular tenderness, erythema, or swelling noted. No rhinorrhea or congestion noted. No facial erythema. Posterior oropharynx shows no erythema, tonsillar hypertrophy, asymmetry or peritonsillar abscess and no evidence of exudate. the uvula is midline. No trismus or drooling is noted. Moist mucous membranes. The patient does have evidence of dental caries, the patient has erosion of the posterior molar with evidence of what appears to be early abscess formation to the left mandibular posterior molar. The patient does have tenderness over this area. There is no swelling to the floor of the mouth. The patient has no evidence of sublingual swelling. The patient has no facial asymmetry. The patient has no trismus or drooling. Cardio: Regular Rate and Rhythm  Respiratory: No acute distress, no rhonchi, wheezing or crackles noted. No stridor or retractions are noted. Neurological: A&O x4, normal speech  Psychiatric: Cooperative       Testing:           Medical Decision Making:     Vital signs reviewed    Past medical history reviewed. Allergies reviewed. Medications reviewed. Patient on arrival does not appear to be in any apparent distress or discomfort. The patient has been seen and evaluated.   The patient does not appear to be toxic

## 2022-08-12 ENCOUNTER — TELEPHONE (OUTPATIENT)
Dept: PRIMARY CARE CLINIC | Age: 42
End: 2022-08-12

## 2022-08-19 ENCOUNTER — HOSPITAL ENCOUNTER (OUTPATIENT)
Age: 42
Discharge: HOME | End: 2022-08-21

## 2022-08-19 LAB
ABO/RH: NORMAL
ALBUMIN SERPL-MCNC: 4.2 G/DL (ref 3.5–5.2)
ALP BLD-CCNC: 58 U/L (ref 35–104)
ALT SERPL-CCNC: 16 U/L (ref 0–32)
ANION GAP SERPL CALCULATED.3IONS-SCNC: 11 MMOL/L (ref 7–16)
ANTIBODY SCREEN: NORMAL
AST SERPL-CCNC: 23 U/L (ref 0–31)
BACTERIA: ABNORMAL /HPF
BASOPHILS ABSOLUTE: 0.04 E9/L (ref 0–0.2)
BASOPHILS RELATIVE PERCENT: 1 % (ref 0–2)
BILIRUB SERPL-MCNC: 0.2 MG/DL (ref 0–1.2)
BILIRUBIN URINE: NEGATIVE
BLOOD, URINE: ABNORMAL
BUN BLDV-MCNC: 10 MG/DL (ref 6–20)
CALCIUM SERPL-MCNC: 9 MG/DL (ref 8.6–10.2)
CHLORIDE BLD-SCNC: 102 MMOL/L (ref 98–107)
CLARITY: CLEAR
CO2: 24 MMOL/L (ref 22–29)
COLOR: YELLOW
CREAT SERPL-MCNC: 0.6 MG/DL (ref 0.5–1)
CRYSTALS, UA: ABNORMAL /HPF
EOSINOPHILS ABSOLUTE: 0.18 E9/L (ref 0.05–0.5)
EOSINOPHILS RELATIVE PERCENT: 4.6 % (ref 0–6)
GFR AFRICAN AMERICAN: >60
GFR NON-AFRICAN AMERICAN: >60 ML/MIN/1.73
GLUCOSE BLD-MCNC: 108 MG/DL (ref 74–99)
GLUCOSE URINE: NEGATIVE MG/DL
HCT VFR BLD CALC: 37.6 % (ref 34–48)
HEMOGLOBIN: 12.2 G/DL (ref 11.5–15.5)
IMMATURE GRANULOCYTES #: 0.01 E9/L
IMMATURE GRANULOCYTES %: 0.3 % (ref 0–5)
INR BLD: 1
KETONES, URINE: NEGATIVE MG/DL
LEUKOCYTE ESTERASE, URINE: NEGATIVE
LYMPHOCYTES ABSOLUTE: 1.15 E9/L (ref 1.5–4)
LYMPHOCYTES RELATIVE PERCENT: 29.6 % (ref 20–42)
MCH RBC QN AUTO: 35.5 PG (ref 26–35)
MCHC RBC AUTO-ENTMCNC: 32.4 % (ref 32–34.5)
MCV RBC AUTO: 109.3 FL (ref 80–99.9)
MONOCYTES ABSOLUTE: 0.29 E9/L (ref 0.1–0.95)
MONOCYTES RELATIVE PERCENT: 7.5 % (ref 2–12)
NEUTROPHILS ABSOLUTE: 2.22 E9/L (ref 1.8–7.3)
NEUTROPHILS RELATIVE PERCENT: 57 % (ref 43–80)
NITRITE, URINE: NEGATIVE
PDW BLD-RTO: 11.9 FL (ref 11.5–15)
PH UA: 6.5 (ref 5–9)
PLATELET # BLD: 268 E9/L (ref 130–450)
PMV BLD AUTO: 10 FL (ref 7–12)
POTASSIUM SERPL-SCNC: 4.1 MMOL/L (ref 3.5–5)
PROTEIN UA: NEGATIVE MG/DL
PROTHROMBIN TIME: 10.9 SEC (ref 9.3–12.4)
RBC # BLD: 3.44 E12/L (ref 3.5–5.5)
RBC UA: ABNORMAL /HPF (ref 0–2)
SEDIMENTATION RATE, ERYTHROCYTE: 18 MM/HR (ref 0–20)
SODIUM BLD-SCNC: 137 MMOL/L (ref 132–146)
SPECIFIC GRAVITY UA: 1.02 (ref 1–1.03)
TOTAL PROTEIN: 7.1 G/DL (ref 6.4–8.3)
UROBILINOGEN, URINE: 0.2 E.U./DL
WBC # BLD: 3.9 E9/L (ref 4.5–11.5)
WBC UA: ABNORMAL /HPF (ref 0–5)

## 2022-08-19 PROCEDURE — 85651 RBC SED RATE NONAUTOMATED: CPT

## 2022-08-19 PROCEDURE — 87088 URINE BACTERIA CULTURE: CPT

## 2022-08-19 PROCEDURE — 87081 CULTURE SCREEN ONLY: CPT

## 2022-08-19 PROCEDURE — 85610 PROTHROMBIN TIME: CPT

## 2022-08-19 PROCEDURE — 85025 COMPLETE CBC W/AUTO DIFF WBC: CPT

## 2022-08-19 PROCEDURE — 86901 BLOOD TYPING SEROLOGIC RH(D): CPT

## 2022-08-19 PROCEDURE — 86900 BLOOD TYPING SEROLOGIC ABO: CPT

## 2022-08-19 PROCEDURE — 81001 URINALYSIS AUTO W/SCOPE: CPT

## 2022-08-19 PROCEDURE — 86850 RBC ANTIBODY SCREEN: CPT

## 2022-08-19 PROCEDURE — 80053 COMPREHEN METABOLIC PANEL: CPT

## 2022-08-21 LAB
MRSA CULTURE ONLY: NORMAL
URINE CULTURE, ROUTINE: NORMAL

## 2022-08-24 ENCOUNTER — OFFICE VISIT (OUTPATIENT)
Dept: PRIMARY CARE CLINIC | Age: 42
End: 2022-08-24
Payer: COMMERCIAL

## 2022-08-24 VITALS
OXYGEN SATURATION: 99 % | BODY MASS INDEX: 32.49 KG/M2 | DIASTOLIC BLOOD PRESSURE: 86 MMHG | HEART RATE: 92 BPM | WEIGHT: 195 LBS | HEIGHT: 65 IN | TEMPERATURE: 97.6 F | SYSTOLIC BLOOD PRESSURE: 126 MMHG | RESPIRATION RATE: 18 BRPM

## 2022-08-24 DIAGNOSIS — R71.8 ELEVATED MCV: ICD-10-CM

## 2022-08-24 DIAGNOSIS — M54.41 CHRONIC RIGHT-SIDED LOW BACK PAIN WITH RIGHT-SIDED SCIATICA: ICD-10-CM

## 2022-08-24 DIAGNOSIS — F33.1 MODERATE EPISODE OF RECURRENT MAJOR DEPRESSIVE DISORDER (HCC): ICD-10-CM

## 2022-08-24 DIAGNOSIS — E03.8 SUBCLINICAL HYPOTHYROIDISM: ICD-10-CM

## 2022-08-24 DIAGNOSIS — M79.7 FIBROMYALGIA: ICD-10-CM

## 2022-08-24 DIAGNOSIS — F41.9 ANXIETY: ICD-10-CM

## 2022-08-24 DIAGNOSIS — Z01.818 PREOPERATIVE EXAMINATION: Primary | ICD-10-CM

## 2022-08-24 DIAGNOSIS — G89.29 CHRONIC RIGHT-SIDED LOW BACK PAIN WITH RIGHT-SIDED SCIATICA: ICD-10-CM

## 2022-08-24 PROCEDURE — 1036F TOBACCO NON-USER: CPT | Performed by: FAMILY MEDICINE

## 2022-08-24 PROCEDURE — G8417 CALC BMI ABV UP PARAM F/U: HCPCS | Performed by: FAMILY MEDICINE

## 2022-08-24 PROCEDURE — 99213 OFFICE O/P EST LOW 20 MIN: CPT | Performed by: FAMILY MEDICINE

## 2022-08-24 PROCEDURE — G8427 DOCREV CUR MEDS BY ELIG CLIN: HCPCS | Performed by: FAMILY MEDICINE

## 2022-08-24 RX ORDER — ARIPIPRAZOLE 5 MG/1
5 TABLET ORAL DAILY
Qty: 30 TABLET | Refills: 0 | Status: SHIPPED
Start: 2022-08-24

## 2022-08-24 RX ORDER — DESVENLAFAXINE 100 MG/1
100 TABLET, EXTENDED RELEASE ORAL DAILY
Qty: 30 TABLET | Refills: 0 | Status: SHIPPED
Start: 2022-08-24

## 2022-08-24 ASSESSMENT — ENCOUNTER SYMPTOMS
ABDOMINAL PAIN: 0
CONSTIPATION: 0
BACK PAIN: 1
SHORTNESS OF BREATH: 0
NAUSEA: 0
SORE THROAT: 0
VOMITING: 0
WHEEZING: 0
RHINORRHEA: 0
DIARRHEA: 0

## 2022-08-24 NOTE — PROGRESS NOTES
2022     Chief Complaint   Patient presents with    Pre-op Exam       HPI  Richard Ovalle (:  1980) is a 35 y.o. female, here for evaluation of the following medical concerns:    Richard Ovalle is a 35 y.o. female who presents to the office today for a preoperative consultation at the request of surgeon Dr. Casillas who plans on performing Lumbar Fusion Surgery on Aug 26th, 2022. This consultation is requested for the specific conditions prompting preoperative evaluation (i.e. because of potential affect on operative risk): Normal.  Planned anesthesia is General.  The patient has the following known anesthesia issues: None  Patient has a bleeding risk of : no recent abnormal bleeding, no remote history of abnormal bleeding  Patient does not have objection to receiving blood products if needed. Patient's medications, allergies, past medical, surgical, social and family histories were reviewed and updated as appropriate. Patient has CBC with differential, CMP, PT/INR, chest x-ray, and EKG which were all essentially similar to previous or normal.    Patient is a 64-year-old female with a past medical history subclinical hypothyroidism, chronic low back pain, dermatitis, depression/anxiety, fibromyalgia. Holding NSAIDs for surgery. No Supplements. Will hold her ADHD meds the night before surgery. Review of Systems   Constitutional:  Negative for chills and fever. HENT:  Negative for congestion, rhinorrhea and sore throat. Respiratory:  Negative for shortness of breath and wheezing. Cardiovascular:  Negative for chest pain and leg swelling. Gastrointestinal:  Negative for abdominal pain, constipation, diarrhea, nausea and vomiting. Musculoskeletal:  Positive for arthralgias and back pain. Skin:  Negative for rash. Neurological:  Positive for numbness. Negative for light-headedness and headaches.      Past Medical History:   Diagnosis Date    Abnormal Pap smear     Anxiety [facility]     Asthma     does not use inhalers    Chronic pain     Hx LEEP (loop electrosurgical excision procedure), cervix, pregnancy 2008    Major depressive disorder, recurrent episode, unspecified 8/5/2011    Mental disorders of mother, antepartum 8/5/2011       Prior to Visit Medications    Medication Sig Taking? Authorizing Provider   desvenlafaxine (KHEDEZLA) 100 MG TB24 ER tablet 1 tablet daily Yes Florian Carlson MD   ARIPiprazole (ABILIFY) 5 MG tablet 1 tablet daily Yes Florian Carlson MD   Naproxen Sodium 220 MG CAPS Take by mouth  Yes Historical Provider, MD   ibuprofen (ADVIL;MOTRIN) 200 MG tablet Take 200 mg by mouth every 6 hours as needed for Pain Yes Historical Provider, MD   acetaminophen (TYLENOL) 500 MG tablet Take 500 mg by mouth every 6 hours as needed for Pain  Yes Historical Provider, MD   topiramate (TOPAMAX) 200 MG tablet Take 1 tablet by mouth daily Yes Historical Provider, MD   amphetamine-dextroamphetamine (ADDERALL XR) 25 MG extended release capsule Take 1 capsule by mouth 2 times daily. Yes Historical Provider, MD   traZODone (DESYREL) 150 MG tablet Take 1 tablet by mouth daily Yes Historical Provider, MD   ALPRAZolam Keithtunde Clark) 1 MG tablet  Yes Historical Provider, MD        No Known Allergies    Social History     Tobacco Use    Smoking status: Former    Smokeless tobacco: Never    Tobacco comments:     quit 20 years ago   Substance Use Topics    Alcohol use: Yes     Comment: occassionaly           Vitals:    08/24/22 1539   BP: 126/86   Pulse: 92   Resp: 18   Temp: 97.6 °F (36.4 °C)   TempSrc: Temporal   SpO2: 99%   Weight: 195 lb (88.5 kg)   Height: 5' 5\" (1.651 m)     Estimated body mass index is 32.45 kg/m² as calculated from the following:    Height as of this encounter: 5' 5\" (1.651 m). Weight as of this encounter: 195 lb (88.5 kg). Physical Exam  Constitutional:       Appearance: She is well-developed. HENT:      Head: Normocephalic.    Eyes: Extraocular Movements: Extraocular movements intact. Conjunctiva/sclera: Conjunctivae normal.   Cardiovascular:      Rate and Rhythm: Normal rate and regular rhythm. Heart sounds: Normal heart sounds. No murmur heard. Pulmonary:      Effort: Pulmonary effort is normal.      Breath sounds: Normal breath sounds. No wheezing or rales. Abdominal:      General: Bowel sounds are normal.      Palpations: Abdomen is soft. Tenderness: There is no abdominal tenderness. Musculoskeletal:      Right lower leg: No edema. Left lower leg: No edema. Comments: Right-sided leg and lower back pain. Neurological:      General: No focal deficit present. Mental Status: She is alert. Comments: Cranial nerves grossly intact   Psychiatric:         Mood and Affect: Mood normal.         Judgment: Judgment normal.       ASSESSMENT/PLAN:  Cora was seen today for pre-op exam.    Diagnoses and all orders for this visit:    Preoperative examination    Chronic right-sided low back pain with right-sided sciatica    Anxiety    Moderate episode of recurrent major depressive disorder (HCC)    Subclinical hypothyroidism    Elevated MCV    Fibromyalgia    Other orders  -     desvenlafaxine (KHEDEZLA) 100 MG TB24 ER tablet; 1 tablet daily  -     ARIPiprazole (ABILIFY) 5 MG tablet; 1 tablet daily     Assessment:        35 y.o. female with planned surgery as above. Known risk factors for perioperative complications: None    Difficulty with intubation is not anticipated. Cardiac Risk Estimation: per the Revised Cardiac Risk Index (Circ. 100:1043, 1999), the patient's risk factors for cardiac complications include none, putting her in: RCI RISK CLASS I (0 risk factors, risk of major cardiac compl. appr. 0.5%)    Current medications which may produce withdrawal symptoms if withheld perioperatively: Psych medications. Plan:      1. Preoperative workup as follows: Already completed  2.  Change in medication regimen before surgery: Avoid supplements and OTC pain medications. Restart as directed by surgeon. Patient to hold her Adderall the night prior to surgery as directed. 3. Prophylaxis for cardiac events with perioperative beta-blockers: not indicated  4. Invasive hemodynamic monitoring perioperatively: at the discretion of anesthesiologist  5. Deep vein thrombosis prophylaxis postoperatively:regimen to be chosen by surgical team  6. Surveillance for postoperative MI with ECG immediately postoperatively and on postoperative days 1 and 2 AND troponin levels 24 hours postoperatively and on day 4 or hospital discharge (whichever comes first): Not indicated. 7. Other measures: None     Low risk assessment today. Patient to proceed with surgery as seen necessary by patient and surgeon. Results were reviewed and will be faxed with office note today to patient's surgeon. Return if symptoms worsen or fail to improve. An electronicsignature was used to authenticate this note.     --Madonna Lou MD on 8/24/22 at 8:05 AM EDT

## 2022-08-26 ENCOUNTER — HOSPITAL ENCOUNTER (OUTPATIENT)
Age: 42
Discharge: HOME | End: 2022-08-28

## 2022-08-26 PROCEDURE — 88304 TISSUE EXAM BY PATHOLOGIST: CPT

## 2022-08-27 ENCOUNTER — HOSPITAL ENCOUNTER (OUTPATIENT)
Age: 42
Discharge: HOME | End: 2022-08-29

## 2022-08-27 LAB
ANION GAP SERPL CALCULATED.3IONS-SCNC: 11 MMOL/L (ref 7–16)
BUN BLDV-MCNC: 6 MG/DL (ref 6–20)
CALCIUM SERPL-MCNC: 9 MG/DL (ref 8.6–10.2)
CHLORIDE BLD-SCNC: 100 MMOL/L (ref 98–107)
CO2: 26 MMOL/L (ref 22–29)
CREAT SERPL-MCNC: 0.6 MG/DL (ref 0.5–1)
GFR AFRICAN AMERICAN: >60
GFR NON-AFRICAN AMERICAN: >60 ML/MIN/1.73
GLUCOSE BLD-MCNC: 101 MG/DL (ref 74–99)
HCT VFR BLD CALC: 34 % (ref 34–48)
HEMOGLOBIN: 10.7 G/DL (ref 11.5–15.5)
MCH RBC QN AUTO: 34.4 PG (ref 26–35)
MCHC RBC AUTO-ENTMCNC: 31.5 % (ref 32–34.5)
MCV RBC AUTO: 109.3 FL (ref 80–99.9)
PDW BLD-RTO: 12 FL (ref 11.5–15)
PLATELET # BLD: 257 E9/L (ref 130–450)
PMV BLD AUTO: 10.8 FL (ref 7–12)
POTASSIUM SERPL-SCNC: 3.8 MMOL/L (ref 3.5–5)
RBC # BLD: 3.11 E12/L (ref 3.5–5.5)
SODIUM BLD-SCNC: 137 MMOL/L (ref 132–146)
WBC # BLD: 10.5 E9/L (ref 4.5–11.5)

## 2022-08-27 PROCEDURE — 85027 COMPLETE CBC AUTOMATED: CPT

## 2022-08-27 PROCEDURE — 80048 BASIC METABOLIC PNL TOTAL CA: CPT

## 2022-08-28 ENCOUNTER — HOSPITAL ENCOUNTER (OUTPATIENT)
Age: 42
Discharge: HOME | End: 2022-08-30

## 2022-08-28 LAB
ANION GAP SERPL CALCULATED.3IONS-SCNC: 10 MMOL/L (ref 7–16)
BUN BLDV-MCNC: 13 MG/DL (ref 6–20)
CALCIUM SERPL-MCNC: 8.9 MG/DL (ref 8.6–10.2)
CHLORIDE BLD-SCNC: 105 MMOL/L (ref 98–107)
CO2: 24 MMOL/L (ref 22–29)
CREAT SERPL-MCNC: 0.5 MG/DL (ref 0.5–1)
GFR AFRICAN AMERICAN: >60
GFR NON-AFRICAN AMERICAN: >60 ML/MIN/1.73
GLUCOSE BLD-MCNC: 96 MG/DL (ref 74–99)
HCT VFR BLD CALC: 31 % (ref 34–48)
HEMOGLOBIN: 9.9 G/DL (ref 11.5–15.5)
MCH RBC QN AUTO: 34 PG (ref 26–35)
MCHC RBC AUTO-ENTMCNC: 31.9 % (ref 32–34.5)
MCV RBC AUTO: 106.5 FL (ref 80–99.9)
PDW BLD-RTO: 12 FL (ref 11.5–15)
PLATELET # BLD: 221 E9/L (ref 130–450)
PMV BLD AUTO: 10.8 FL (ref 7–12)
POTASSIUM SERPL-SCNC: 3.5 MMOL/L (ref 3.5–5)
RBC # BLD: 2.91 E12/L (ref 3.5–5.5)
SODIUM BLD-SCNC: 139 MMOL/L (ref 132–146)
WBC # BLD: 8.6 E9/L (ref 4.5–11.5)

## 2022-08-28 PROCEDURE — 80048 BASIC METABOLIC PNL TOTAL CA: CPT

## 2022-08-28 PROCEDURE — 85027 COMPLETE CBC AUTOMATED: CPT

## 2023-02-16 ENCOUNTER — OFFICE VISIT (OUTPATIENT)
Dept: PRIMARY CARE CLINIC | Age: 43
End: 2023-02-16
Payer: COMMERCIAL

## 2023-02-16 VITALS
HEART RATE: 92 BPM | SYSTOLIC BLOOD PRESSURE: 114 MMHG | WEIGHT: 211 LBS | BODY MASS INDEX: 35.16 KG/M2 | HEIGHT: 65 IN | RESPIRATION RATE: 18 BRPM | TEMPERATURE: 97.1 F | DIASTOLIC BLOOD PRESSURE: 80 MMHG | OXYGEN SATURATION: 99 %

## 2023-02-16 DIAGNOSIS — Z01.818 PRE-OP EXAM: ICD-10-CM

## 2023-02-16 DIAGNOSIS — M16.11 OSTEOARTHRITIS OF RIGHT HIP, UNSPECIFIED OSTEOARTHRITIS TYPE: ICD-10-CM

## 2023-02-16 DIAGNOSIS — E03.8 SUBCLINICAL HYPOTHYROIDISM: ICD-10-CM

## 2023-02-16 DIAGNOSIS — M79.7 FIBROMYALGIA: ICD-10-CM

## 2023-02-16 DIAGNOSIS — E03.8 SUBCLINICAL HYPOTHYROIDISM: Primary | ICD-10-CM

## 2023-02-16 LAB
ALBUMIN SERPL-MCNC: 4.3 G/DL (ref 3.5–5.2)
ALP BLD-CCNC: 60 U/L (ref 35–104)
ALT SERPL-CCNC: 8 U/L (ref 0–32)
ANION GAP SERPL CALCULATED.3IONS-SCNC: 11 MMOL/L (ref 7–16)
AST SERPL-CCNC: 17 U/L (ref 0–31)
BACTERIA: ABNORMAL /HPF
BASOPHILS ABSOLUTE: 0.03 E9/L (ref 0–0.2)
BASOPHILS RELATIVE PERCENT: 0.6 % (ref 0–2)
BILIRUB SERPL-MCNC: 0.3 MG/DL (ref 0–1.2)
BILIRUBIN URINE: NEGATIVE
BLOOD, URINE: NORMAL
BUN BLDV-MCNC: 12 MG/DL (ref 6–20)
CALCIUM SERPL-MCNC: 9 MG/DL (ref 8.6–10.2)
CHLORIDE BLD-SCNC: 108 MMOL/L (ref 98–107)
CLARITY: CLEAR
CO2: 22 MMOL/L (ref 22–29)
COLOR: YELLOW
CREAT SERPL-MCNC: 0.6 MG/DL (ref 0.5–1)
CRYSTALS, UA: ABNORMAL /HPF
EOSINOPHILS ABSOLUTE: 0.17 E9/L (ref 0.05–0.5)
EOSINOPHILS RELATIVE PERCENT: 3.5 % (ref 0–6)
EPITHELIAL CELLS, UA: ABNORMAL /HPF
GFR SERPL CREATININE-BSD FRML MDRD: >60 ML/MIN/1.73
GLUCOSE BLD-MCNC: 105 MG/DL (ref 74–99)
GLUCOSE URINE: NEGATIVE MG/DL
HCT VFR BLD CALC: 39.4 % (ref 34–48)
HEMOGLOBIN: 12.5 G/DL (ref 11.5–15.5)
IMMATURE GRANULOCYTES #: 0.01 E9/L
IMMATURE GRANULOCYTES %: 0.2 % (ref 0–5)
INR BLD: 1
KETONES, URINE: NEGATIVE MG/DL
LEUKOCYTE ESTERASE, URINE: NEGATIVE
LYMPHOCYTES ABSOLUTE: 1.63 E9/L (ref 1.5–4)
LYMPHOCYTES RELATIVE PERCENT: 33.8 % (ref 20–42)
MCH RBC QN AUTO: 32.3 PG (ref 26–35)
MCHC RBC AUTO-ENTMCNC: 31.7 % (ref 32–34.5)
MCV RBC AUTO: 101.8 FL (ref 80–99.9)
MONOCYTES ABSOLUTE: 0.33 E9/L (ref 0.1–0.95)
MONOCYTES RELATIVE PERCENT: 6.8 % (ref 2–12)
NEUTROPHILS ABSOLUTE: 2.65 E9/L (ref 1.8–7.3)
NEUTROPHILS RELATIVE PERCENT: 55.1 % (ref 43–80)
NITRITE, URINE: NEGATIVE
PDW BLD-RTO: 12.6 FL (ref 11.5–15)
PH UA: 6 (ref 5–9)
PLATELET # BLD: 238 E9/L (ref 130–450)
PMV BLD AUTO: 11.4 FL (ref 7–12)
POTASSIUM SERPL-SCNC: 3.6 MMOL/L (ref 3.5–5)
PROTEIN UA: NEGATIVE MG/DL
PROTHROMBIN TIME: 11.2 SEC (ref 9.3–12.4)
RBC # BLD: 3.87 E12/L (ref 3.5–5.5)
RBC UA: ABNORMAL /HPF (ref 0–2)
SEDIMENTATION RATE, ERYTHROCYTE: 14 MM/HR (ref 0–20)
SODIUM BLD-SCNC: 141 MMOL/L (ref 132–146)
SPECIFIC GRAVITY UA: 1.02 (ref 1–1.03)
TOTAL PROTEIN: 7.4 G/DL (ref 6.4–8.3)
TSH SERPL DL<=0.05 MIU/L-ACNC: 1.62 UIU/ML (ref 0.27–4.2)
UROBILINOGEN, URINE: 1 E.U./DL
WBC # BLD: 4.8 E9/L (ref 4.5–11.5)
WBC UA: ABNORMAL /HPF (ref 0–5)

## 2023-02-16 PROCEDURE — 99213 OFFICE O/P EST LOW 20 MIN: CPT | Performed by: FAMILY MEDICINE

## 2023-02-16 PROCEDURE — 1036F TOBACCO NON-USER: CPT | Performed by: FAMILY MEDICINE

## 2023-02-16 PROCEDURE — 93000 ELECTROCARDIOGRAM COMPLETE: CPT | Performed by: FAMILY MEDICINE

## 2023-02-16 PROCEDURE — G8417 CALC BMI ABV UP PARAM F/U: HCPCS | Performed by: FAMILY MEDICINE

## 2023-02-16 PROCEDURE — G8484 FLU IMMUNIZE NO ADMIN: HCPCS | Performed by: FAMILY MEDICINE

## 2023-02-16 PROCEDURE — G8427 DOCREV CUR MEDS BY ELIG CLIN: HCPCS | Performed by: FAMILY MEDICINE

## 2023-02-16 ASSESSMENT — ENCOUNTER SYMPTOMS
ABDOMINAL PAIN: 0
NAUSEA: 0
SORE THROAT: 0
SHORTNESS OF BREATH: 0
DIARRHEA: 0
RHINORRHEA: 0
VOMITING: 0
BACK PAIN: 1
CONSTIPATION: 0
WHEEZING: 0

## 2023-02-16 ASSESSMENT — PATIENT HEALTH QUESTIONNAIRE - PHQ9
2. FEELING DOWN, DEPRESSED OR HOPELESS: 0
5. POOR APPETITE OR OVEREATING: 0
10. IF YOU CHECKED OFF ANY PROBLEMS, HOW DIFFICULT HAVE THESE PROBLEMS MADE IT FOR YOU TO DO YOUR WORK, TAKE CARE OF THINGS AT HOME, OR GET ALONG WITH OTHER PEOPLE: 0
3. TROUBLE FALLING OR STAYING ASLEEP: 0
6. FEELING BAD ABOUT YOURSELF - OR THAT YOU ARE A FAILURE OR HAVE LET YOURSELF OR YOUR FAMILY DOWN: 0
SUM OF ALL RESPONSES TO PHQ QUESTIONS 1-9: 0
7. TROUBLE CONCENTRATING ON THINGS, SUCH AS READING THE NEWSPAPER OR WATCHING TELEVISION: 0
9. THOUGHTS THAT YOU WOULD BE BETTER OFF DEAD, OR OF HURTING YOURSELF: 0
4. FEELING TIRED OR HAVING LITTLE ENERGY: 0
8. MOVING OR SPEAKING SO SLOWLY THAT OTHER PEOPLE COULD HAVE NOTICED. OR THE OPPOSITE, BEING SO FIGETY OR RESTLESS THAT YOU HAVE BEEN MOVING AROUND A LOT MORE THAN USUAL: 0
SUM OF ALL RESPONSES TO PHQ QUESTIONS 1-9: 0
SUM OF ALL RESPONSES TO PHQ QUESTIONS 1-9: 0
SUM OF ALL RESPONSES TO PHQ9 QUESTIONS 1 & 2: 0
SUM OF ALL RESPONSES TO PHQ QUESTIONS 1-9: 0
1. LITTLE INTEREST OR PLEASURE IN DOING THINGS: 0

## 2023-02-16 NOTE — PROGRESS NOTES
2023     Chief Complaint   Patient presents with    Pre-op Exam     Right hip arthroplasty       HPI  Ricarda Norman (:  1980) is a 38 y.o. female, here for evaluation of the following medical concerns:    Ricarda Norman is a 38 y.o. female who presents to the office today for a preoperative consultation at the request of surgeon Dr. CASA SHEELA [facility] who plans on performing Rt Total Hip on 03/10/2023. This consultation is requested for the specific conditions prompting preoperative evaluation (i.e. because of potential affect on operative risk): Normal.  Planned anesthesia is General.  The patient has the following known anesthesia issues: None  Patient has a bleeding risk of : no recent abnormal bleeding, no remote history of abnormal bleeding  Patient does not have objection to receiving blood products if needed. Patient's medications, allergies, past medical, surgical, social and family histories were reviewed and updated as appropriate. Patient has CBC with differential, CMP, PT/INR, chest x-ray, and EKG which were all essentially similar to previous or normal.     Patient is a 25-year-old female with a past medical history subclinical hypothyroidism, chronic low back pain, dermatitis, depression/anxiety, fibromyalgia. Holding NSAIDs for surgery. No Supplements. Will hold her ADHD meds the night before surgery. Review of Systems   Constitutional:  Negative for chills and fever. HENT:  Negative for congestion, rhinorrhea and sore throat. Respiratory:  Negative for shortness of breath and wheezing. Cardiovascular:  Negative for chest pain and leg swelling. Gastrointestinal:  Negative for abdominal pain, constipation, diarrhea, nausea and vomiting. Musculoskeletal:  Positive for arthralgias, back pain and gait problem. Skin:  Negative for rash. Neurological:  Positive for numbness. Negative for light-headedness and headaches.      Past Medical History:   Diagnosis Date Abnormal Pap smear     Anxiety     [facility]     Asthma     does not use inhalers    Chronic pain     Hx LEEP (loop electrosurgical excision procedure), cervix, pregnancy 2008    Major depressive disorder, recurrent episode, unspecified 8/5/2011    Mental disorders of mother, antepartum 8/5/2011       Prior to Visit Medications    Medication Sig Taking? Authorizing Provider   desvenlafaxine (KHEDEZLA) 100 MG TB24 ER tablet 1 tablet daily Yes Neptali Burroughs MD   ARIPiprazole (ABILIFY) 5 MG tablet 1 tablet daily Yes Neptali Burroughs MD   Naproxen Sodium 220 MG CAPS Take by mouth  Yes Historical Provider, MD   ibuprofen (ADVIL;MOTRIN) 200 MG tablet Take 200 mg by mouth every 6 hours as needed for Pain Yes Historical Provider, MD   acetaminophen (TYLENOL) 500 MG tablet Take 500 mg by mouth every 6 hours as needed for Pain  Yes Historical Provider, MD   topiramate (TOPAMAX) 200 MG tablet Take 1 tablet by mouth daily Yes Historical Provider, MD   amphetamine-dextroamphetamine (ADDERALL XR) 25 MG extended release capsule Take 1 capsule by mouth 2 times daily. Yes Historical Provider, MD   traZODone (DESYREL) 150 MG tablet Take 1 tablet by mouth daily Yes Historical Provider, MD   ALPRAZolam Jeanethse Lo) 1 MG tablet  Yes Historical Provider, MD        No Known Allergies    Social History     Tobacco Use    Smoking status: Former    Smokeless tobacco: Never    Tobacco comments:     quit 20 years ago   Substance Use Topics    Alcohol use: Yes     Comment: occassionaly           Vitals:    02/16/23 1358   BP: 114/80   Pulse: 92   Resp: 18   Temp: 97.1 °F (36.2 °C)   TempSrc: Temporal   SpO2: 99%   Weight: 211 lb (95.7 kg)   Height: 5' 5\" (1.651 m)     Estimated body mass index is 35.11 kg/m² as calculated from the following:    Height as of this encounter: 5' 5\" (1.651 m). Weight as of this encounter: 211 lb (95.7 kg). Physical Exam  Constitutional:       Appearance: She is well-developed.    HENT:      Head: Normocephalic. Eyes:      Extraocular Movements: Extraocular movements intact. Conjunctiva/sclera: Conjunctivae normal.   Cardiovascular:      Rate and Rhythm: Normal rate and regular rhythm. Heart sounds: Normal heart sounds. No murmur heard. Pulmonary:      Effort: Pulmonary effort is normal.      Breath sounds: Normal breath sounds. No wheezing or rales. Abdominal:      General: Bowel sounds are normal.      Palpations: Abdomen is soft. Tenderness: There is no abdominal tenderness. Musculoskeletal:      Comments: Mild bilateral lower extremity lymphedema. Neurological:      General: No focal deficit present. Mental Status: She is alert. Comments: Cranial nerves grossly intact   Psychiatric:         Mood and Affect: Mood normal.         Judgment: Judgment normal.       ASSESSMENT/PLAN:  Cora was seen today for pre-op exam.    Diagnoses and all orders for this visit:    Subclinical hypothyroidism  -     TSH; Future    Fibromyalgia    Osteoarthritis of right hip, unspecified osteoarthritis type    Pre-op exam  -     Comprehensive Metabolic Panel; Future  -     CBC with Auto Differential; Future  -     Sedimentation Rate; Future  -     Protime-INR; Future  -     Urinalysis; Future  -     Culture, Urine; Future  -     EKG 12 lead; Future  -     EKG 12 lead       Assessment:        38 y.o. female with planned surgery as above. Known risk factors for perioperative complications: None    Difficulty with intubation is not anticipated. Cardiac Risk Estimation: per the Revised Cardiac Risk Index (Circ. 100:1043, 1999), the patient's risk factors for cardiac complications include none, putting her in: RCI RISK CLASS I (0 risk factors, risk of major cardiac compl. appr. 0.5%)     Current medications which may produce withdrawal symptoms if withheld perioperatively: Psych medications. Plan:       1. Preoperative workup as follows: Ordered. Will fax results to surgeon.   2. Change in medication regimen before surgery: Avoid supplements and OTC pain medications. Restart as directed by surgeon. Patient to hold her Adderall the night prior to surgery as directed. 3. Prophylaxis for cardiac events with perioperative beta-blockers: not indicated  4. Invasive hemodynamic monitoring perioperatively: at the discretion of anesthesiologist  5. Deep vein thrombosis prophylaxis postoperatively:regimen to be chosen by surgical team  6. Surveillance for postoperative MI with ECG immediately postoperatively and on postoperative days 1 and 2 AND troponin levels 24 hours postoperatively and on day 4 or hospital discharge (whichever comes first): Not indicated. 7. Other measures: None      Low risk assessment today. Patient to proceed with surgery as seen necessary by patient and surgeon. Results were reviewed and will be faxed with office note today to patient's surgeon. Return in about 3 months (around 5/16/2023) for Establish with new PCP. An electronicsignature was used to authenticate this note.     --Chivo Godoy MD on 2/16/23 at 8:30 AM EST

## 2023-02-19 LAB — URINE CULTURE, ROUTINE: NORMAL

## 2023-02-20 ENCOUNTER — HOSPITAL ENCOUNTER (OUTPATIENT)
Age: 43
Discharge: HOME | End: 2023-02-22

## 2023-02-20 LAB
ABO/RH: NORMAL
ANTIBODY SCREEN: NORMAL

## 2023-02-20 PROCEDURE — 86850 RBC ANTIBODY SCREEN: CPT

## 2023-02-20 PROCEDURE — 86901 BLOOD TYPING SEROLOGIC RH(D): CPT

## 2023-02-20 PROCEDURE — 86900 BLOOD TYPING SEROLOGIC ABO: CPT

## 2023-02-20 PROCEDURE — 87081 CULTURE SCREEN ONLY: CPT

## 2023-02-22 LAB — MRSA CULTURE ONLY: NORMAL

## 2023-02-25 ENCOUNTER — HOSPITAL ENCOUNTER (OUTPATIENT)
Age: 43
Discharge: HOME | End: 2023-02-27
Payer: COMMERCIAL

## 2023-02-25 LAB
ANION GAP SERPL CALCULATED.3IONS-SCNC: 10 MMOL/L (ref 7–16)
BUN BLDV-MCNC: 9 MG/DL (ref 6–20)
CALCIUM SERPL-MCNC: 8.7 MG/DL (ref 8.6–10.2)
CHLORIDE BLD-SCNC: 107 MMOL/L (ref 98–107)
CO2: 23 MMOL/L (ref 22–29)
CREAT SERPL-MCNC: 0.6 MG/DL (ref 0.5–1)
GFR SERPL CREATININE-BSD FRML MDRD: >60 ML/MIN/1.73
GLUCOSE BLD-MCNC: 120 MG/DL (ref 74–99)
HCT VFR BLD CALC: 33.1 % (ref 34–48)
HEMOGLOBIN: 10.3 G/DL (ref 11.5–15.5)
MCH RBC QN AUTO: 32.5 PG (ref 26–35)
MCHC RBC AUTO-ENTMCNC: 31.1 % (ref 32–34.5)
MCV RBC AUTO: 104.4 FL (ref 80–99.9)
PDW BLD-RTO: 12.6 FL (ref 11.5–15)
PLATELET # BLD: 193 E9/L (ref 130–450)
PMV BLD AUTO: 11.7 FL (ref 7–12)
POTASSIUM SERPL-SCNC: 4.5 MMOL/L (ref 3.5–5)
RBC # BLD: 3.17 E12/L (ref 3.5–5.5)
SODIUM BLD-SCNC: 140 MMOL/L (ref 132–146)
WBC # BLD: 9.8 E9/L (ref 4.5–11.5)

## 2023-02-25 PROCEDURE — 80048 BASIC METABOLIC PNL TOTAL CA: CPT

## 2023-02-25 PROCEDURE — 85027 COMPLETE CBC AUTOMATED: CPT

## 2023-02-26 ENCOUNTER — HOSPITAL ENCOUNTER (OUTPATIENT)
Age: 43
Discharge: HOME | End: 2023-02-28

## 2023-02-26 LAB
ANION GAP SERPL CALCULATED.3IONS-SCNC: 8 MMOL/L (ref 7–16)
BUN BLDV-MCNC: 9 MG/DL (ref 6–20)
CALCIUM SERPL-MCNC: 8.6 MG/DL (ref 8.6–10.2)
CHLORIDE BLD-SCNC: 111 MMOL/L (ref 98–107)
CO2: 23 MMOL/L (ref 22–29)
CREAT SERPL-MCNC: 0.6 MG/DL (ref 0.5–1)
GFR SERPL CREATININE-BSD FRML MDRD: >60 ML/MIN/1.73
GLUCOSE BLD-MCNC: 88 MG/DL (ref 74–99)
HCT VFR BLD CALC: 26.9 % (ref 34–48)
HEMOGLOBIN: 8.4 G/DL (ref 11.5–15.5)
MCH RBC QN AUTO: 32.6 PG (ref 26–35)
MCHC RBC AUTO-ENTMCNC: 31.2 % (ref 32–34.5)
MCV RBC AUTO: 104.3 FL (ref 80–99.9)
PDW BLD-RTO: 13.1 FL (ref 11.5–15)
PLATELET # BLD: 150 E9/L (ref 130–450)
PMV BLD AUTO: 12 FL (ref 7–12)
POTASSIUM SERPL-SCNC: 3.9 MMOL/L (ref 3.5–5)
RBC # BLD: 2.58 E12/L (ref 3.5–5.5)
SODIUM BLD-SCNC: 142 MMOL/L (ref 132–146)
WBC # BLD: 9.1 E9/L (ref 4.5–11.5)

## 2023-02-26 PROCEDURE — 85027 COMPLETE CBC AUTOMATED: CPT

## 2023-02-26 PROCEDURE — 80048 BASIC METABOLIC PNL TOTAL CA: CPT

## 2024-01-12 ENCOUNTER — APPOINTMENT (OUTPATIENT)
Dept: CT IMAGING | Age: 44
End: 2024-01-12
Payer: COMMERCIAL

## 2024-01-12 ENCOUNTER — HOSPITAL ENCOUNTER (EMERGENCY)
Age: 44
Discharge: HOME | End: 2024-01-12
Attending: EMERGENCY MEDICINE
Payer: COMMERCIAL

## 2024-01-12 ENCOUNTER — OFFICE VISIT (OUTPATIENT)
Dept: FAMILY MEDICINE CLINIC | Age: 44
End: 2024-01-12
Payer: COMMERCIAL

## 2024-01-12 VITALS
BODY MASS INDEX: 33.32 KG/M2 | HEART RATE: 73 BPM | OXYGEN SATURATION: 100 % | DIASTOLIC BLOOD PRESSURE: 78 MMHG | TEMPERATURE: 97.9 F | RESPIRATION RATE: 16 BRPM | SYSTOLIC BLOOD PRESSURE: 130 MMHG | HEIGHT: 65 IN | WEIGHT: 200 LBS

## 2024-01-12 VITALS
BODY MASS INDEX: 33.28 KG/M2 | OXYGEN SATURATION: 99 % | SYSTOLIC BLOOD PRESSURE: 124 MMHG | WEIGHT: 200 LBS | TEMPERATURE: 97.3 F | DIASTOLIC BLOOD PRESSURE: 72 MMHG | HEART RATE: 120 BPM
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
## 2024-01-12 DIAGNOSIS — R07.9 CHEST PAIN, UNSPECIFIED TYPE: Primary | ICD-10-CM

## 2024-01-12 DIAGNOSIS — S20.212A BRUISED RIB, LEFT, INITIAL ENCOUNTER: Primary | ICD-10-CM

## 2024-01-12 DIAGNOSIS — R07.81 RIB PAIN: ICD-10-CM

## 2024-01-12 DIAGNOSIS — M25.552 LEFT HIP PAIN: ICD-10-CM

## 2024-01-12 DIAGNOSIS — R07.81 RIB PAIN ON LEFT SIDE: ICD-10-CM

## 2024-01-12 DIAGNOSIS — S22.42XA CLOSED FRACTURE OF MULTIPLE RIBS OF LEFT SIDE, INITIAL ENCOUNTER: ICD-10-CM

## 2024-01-12 LAB
ALBUMIN SERPL-MCNC: 4.4 G/DL (ref 3.5–5.2)
ALP SERPL-CCNC: 96 U/L (ref 35–104)
ALT SERPL-CCNC: 22 U/L (ref 0–32)
ANION GAP SERPL CALCULATED.3IONS-SCNC: 11 MMOL/L (ref 7–16)
AST SERPL-CCNC: 33 U/L (ref 0–31)
BASOPHILS # BLD: 0.05 K/UL (ref 0–0.2)
BASOPHILS NFR BLD: 1 % (ref 0–2)
BILIRUB SERPL-MCNC: 0.2 MG/DL (ref 0–1.2)
BUN SERPL-MCNC: 14 MG/DL (ref 6–20)
CALCIUM SERPL-MCNC: 9.7 MG/DL (ref 8.6–10.2)
CHLORIDE SERPL-SCNC: 104 MMOL/L (ref 98–107)
CO2 SERPL-SCNC: 23 MMOL/L (ref 22–29)
CREAT SERPL-MCNC: 0.6 MG/DL (ref 0.5–1)
EKG ATRIAL RATE: 97 BPM
EKG P AXIS: 47 DEGREES
EKG P-R INTERVAL: 160 MS
EKG Q-T INTERVAL: 376 MS
EKG QRS DURATION: 88 MS
EKG QTC CALCULATION (BAZETT): 477 MS
EKG R AXIS: 26 DEGREES
EKG T AXIS: 17 DEGREES
EKG VENTRICULAR RATE: 97 BPM
EOSINOPHIL # BLD: 0.1 K/UL (ref 0.05–0.5)
EOSINOPHILS RELATIVE PERCENT: 1 % (ref 0–6)
ERYTHROCYTE [DISTWIDTH] IN BLOOD BY AUTOMATED COUNT: 12.9 % (ref 11.5–15)
GFR SERPL CREATININE-BSD FRML MDRD: >60 ML/MIN/1.73M2
GLUCOSE SERPL-MCNC: 95 MG/DL (ref 74–99)
HCG, URINE, POC: NEGATIVE
HCT VFR BLD AUTO: 40.4 % (ref 34–48)
HGB BLD-MCNC: 13.3 G/DL (ref 11.5–15.5)
IMM GRANULOCYTES # BLD AUTO: 0.03 K/UL (ref 0–0.58)
IMM GRANULOCYTES NFR BLD: 0 % (ref 0–5)
LYMPHOCYTES NFR BLD: 1.62 K/UL (ref 1.5–4)
LYMPHOCYTES RELATIVE PERCENT: 23 % (ref 20–42)
Lab: NORMAL
MCH RBC QN AUTO: 33.3 PG (ref 26–35)
MCHC RBC AUTO-ENTMCNC: 32.9 G/DL (ref 32–34.5)
MCV RBC AUTO: 101.3 FL (ref 80–99.9)
MONOCYTES NFR BLD: 0.39 K/UL (ref 0.1–0.95)
MONOCYTES NFR BLD: 6 % (ref 2–12)
NEGATIVE QC PASS/FAIL: NORMAL
NEUTROPHILS NFR BLD: 69 % (ref 43–80)
NEUTS SEG NFR BLD: 4.85 K/UL (ref 1.8–7.3)
PLATELET # BLD AUTO: 256 K/UL (ref 130–450)
PMV BLD AUTO: 10.4 FL (ref 7–12)
POSITIVE QC PASS/FAIL: NORMAL
POTASSIUM SERPL-SCNC: 3.9 MMOL/L (ref 3.5–5)
PROT SERPL-MCNC: 7.9 G/DL (ref 6.4–8.3)
RBC # BLD AUTO: 3.99 M/UL (ref 3.5–5.5)
SODIUM SERPL-SCNC: 138 MMOL/L (ref 132–146)
TROPONIN I SERPL HS-MCNC: <6 NG/L (ref 0–9)
TROPONIN INTERP: <6
WBC OTHER # BLD: 7 K/UL (ref 4.5–11.5)

## 2024-01-12 PROCEDURE — 96374 THER/PROPH/DIAG INJ IV PUSH: CPT

## 2024-01-12 PROCEDURE — G8427 DOCREV CUR MEDS BY ELIG CLIN: HCPCS | Performed by: PHYSICIAN ASSISTANT

## 2024-01-12 PROCEDURE — 2580000003 HC RX 258

## 2024-01-12 PROCEDURE — G8484 FLU IMMUNIZE NO ADMIN: HCPCS | Performed by: PHYSICIAN ASSISTANT

## 2024-01-12 PROCEDURE — 80053 COMPREHEN METABOLIC PANEL: CPT

## 2024-01-12 PROCEDURE — 1036F TOBACCO NON-USER: CPT | Performed by: PHYSICIAN ASSISTANT

## 2024-01-12 PROCEDURE — 6370000000 HC RX 637 (ALT 250 FOR IP)

## 2024-01-12 PROCEDURE — 84484 ASSAY OF TROPONIN QUANT: CPT

## 2024-01-12 PROCEDURE — 99285 EMERGENCY DEPT VISIT HI MDM: CPT

## 2024-01-12 PROCEDURE — 96372 THER/PROPH/DIAG INJ SC/IM: CPT

## 2024-01-12 PROCEDURE — 93005 ELECTROCARDIOGRAM TRACING: CPT | Performed by: NURSE PRACTITIONER

## 2024-01-12 PROCEDURE — 6360000002 HC RX W HCPCS

## 2024-01-12 PROCEDURE — 85025 COMPLETE CBC W/AUTO DIFF WBC: CPT

## 2024-01-12 PROCEDURE — 99215 OFFICE O/P EST HI 40 MIN: CPT | Performed by: PHYSICIAN ASSISTANT

## 2024-01-12 PROCEDURE — 93000 ELECTROCARDIOGRAM COMPLETE: CPT | Performed by: PHYSICIAN ASSISTANT

## 2024-01-12 PROCEDURE — 6360000004 HC RX CONTRAST MEDICATION: Performed by: RADIOLOGY

## 2024-01-12 PROCEDURE — G8417 CALC BMI ABV UP PARAM F/U: HCPCS | Performed by: PHYSICIAN ASSISTANT

## 2024-01-12 PROCEDURE — 71275 CT ANGIOGRAPHY CHEST: CPT

## 2024-01-12 PROCEDURE — 93010 ELECTROCARDIOGRAM REPORT: CPT | Performed by: INTERNAL MEDICINE

## 2024-01-12 RX ORDER — ORPHENADRINE CITRATE 100 MG/1
100 TABLET, EXTENDED RELEASE ORAL 2 TIMES DAILY
Qty: 20 TABLET | Refills: 0 | Status: SHIPPED | OUTPATIENT
Start: 2024-01-12 | End: 2024-01-22

## 2024-01-12 RX ORDER — OXYCODONE HYDROCHLORIDE AND ACETAMINOPHEN 5; 325 MG/1; MG/1
1 TABLET ORAL ONCE
Status: COMPLETED | OUTPATIENT
Start: 2024-01-12 | End: 2024-01-12

## 2024-01-12 RX ORDER — OXYCODONE HYDROCHLORIDE AND ACETAMINOPHEN 5; 325 MG/1; MG/1
1 TABLET ORAL EVERY 6 HOURS PRN
Qty: 12 TABLET | Refills: 0 | Status: SHIPPED | OUTPATIENT
Start: 2024-01-12 | End: 2024-01-15

## 2024-01-12 RX ORDER — KETOROLAC TROMETHAMINE 30 MG/ML
15 INJECTION, SOLUTION INTRAMUSCULAR; INTRAVENOUS ONCE
Status: COMPLETED | OUTPATIENT
Start: 2024-01-12 | End: 2024-01-12

## 2024-01-12 RX ORDER — 0.9 % SODIUM CHLORIDE 0.9 %
1000 INTRAVENOUS SOLUTION INTRAVENOUS ONCE
Status: COMPLETED | OUTPATIENT
Start: 2024-01-12 | End: 2024-01-12

## 2024-01-12 RX ORDER — NAPROXEN 500 MG/1
500 TABLET ORAL 2 TIMES DAILY PRN
Qty: 20 TABLET | Refills: 0 | Status: SHIPPED | OUTPATIENT
Start: 2024-01-12 | End: 2024-01-22

## 2024-01-12 RX ORDER — ORPHENADRINE CITRATE 30 MG/ML
60 INJECTION INTRAMUSCULAR; INTRAVENOUS ONCE
Status: COMPLETED | OUTPATIENT
Start: 2024-01-12 | End: 2024-01-12

## 2024-01-12 RX ADMIN — SODIUM CHLORIDE 1000 ML: 9 INJECTION, SOLUTION INTRAVENOUS at 15:08

## 2024-01-12 RX ADMIN — ORPHENADRINE CITRATE 60 MG: 60 INJECTION INTRAMUSCULAR; INTRAVENOUS at 14:58

## 2024-01-12 RX ADMIN — OXYCODONE AND ACETAMINOPHEN 1 TABLET: 5; 325 TABLET ORAL at 14:59

## 2024-01-12 RX ADMIN — IOPAMIDOL 75 ML: 755 INJECTION, SOLUTION INTRAVENOUS at 16:19

## 2024-01-12 RX ADMIN — KETOROLAC TROMETHAMINE 15 MG: 30 INJECTION, SOLUTION INTRAMUSCULAR; INTRAVENOUS at 15:07

## 2024-01-12 ASSESSMENT — PAIN DESCRIPTION - DESCRIPTORS
DESCRIPTORS: STABBING;ACHING
DESCRIPTORS: ACHING;SHARP

## 2024-01-12 ASSESSMENT — PAIN DESCRIPTION - ORIENTATION
ORIENTATION: LEFT
ORIENTATION: LEFT

## 2024-01-12 ASSESSMENT — ENCOUNTER SYMPTOMS
WHEEZING: 0
ROS SKIN COMMENTS: BRUISE
SHORTNESS OF BREATH: 0
STRIDOR: 0

## 2024-01-12 ASSESSMENT — PAIN SCALES - GENERAL
PAINLEVEL_OUTOF10: 10
PAINLEVEL_OUTOF10: 8

## 2024-01-12 ASSESSMENT — PAIN - FUNCTIONAL ASSESSMENT: PAIN_FUNCTIONAL_ASSESSMENT: 0-10

## 2024-01-12 ASSESSMENT — PAIN DESCRIPTION - LOCATION
LOCATION: CHEST
LOCATION: RIB CAGE

## 2024-01-12 NOTE — PROGRESS NOTES
RIGHT L4 AND L5 TRANSFORAMINAL EOIDURAL STEROID INJECTION (CPT 49082) performed by John Paul Guevara MD at [facility] OR    TONSILLECTOMY         Family History   Problem Relation Age of Onset    Breast Cancer Father     Pulmonary Fibrosis Mother        Medications:     Current Outpatient Medications:     desvenlafaxine (KHEDEZLA) 100 MG TB24 ER tablet, 1 tablet daily, Disp: 30 tablet, Rfl: 0    ARIPiprazole (ABILIFY) 5 MG tablet, 1 tablet daily, Disp: 30 tablet, Rfl: 0    Naproxen Sodium 220 MG CAPS, Take by mouth , Disp: , Rfl:     ibuprofen (ADVIL;MOTRIN) 200 MG tablet, Take 200 mg by mouth every 6 hours as needed for Pain, Disp: , Rfl:     acetaminophen (TYLENOL) 500 MG tablet, Take 500 mg by mouth every 6 hours as needed for Pain , Disp: , Rfl:     topiramate (TOPAMAX) 200 MG tablet, Take 1 tablet by mouth daily, Disp: , Rfl:     amphetamine-dextroamphetamine (ADDERALL XR) 25 MG extended release capsule, Take 1 capsule by mouth 2 times daily., Disp: , Rfl:     traZODone (DESYREL) 150 MG tablet, Take 1 tablet by mouth daily, Disp: , Rfl:     ALPRAZolam (XANAX) 1 MG tablet, , Disp: , Rfl:     Allergies:   No Known Allergies    Social History:     Social History     Tobacco Use    Smoking status: Former    Smokeless tobacco: Never    Tobacco comments:     quit 20 years ago   Vaping Use    Vaping Use: Never used   Substance Use Topics    Alcohol use: Yes     Comment: occassionaly    Drug use: No       Patient lives at home.    Physical Exam:     Vitals:    01/12/24 1148   BP: 124/72   Site: Right Upper Arm   Position: Sitting   Pulse: (!) 120   Temp: 97.3 °F (36.3 °C)   TempSrc: Temporal   SpO2: 99%   Weight: 90.7 kg (200 lb)       Exam:  Physical Exam  Nurses note and vital signs reviewed and patient is not hypoxic.    General: The patient appears well and in no apparent distress. Patient is resting comfortably on cart.  Skin: Warm, dry, no pallor noted. There is no rash noted.  Head: Normocephalic,

## 2024-01-12 NOTE — DISCHARGE INSTRUCTIONS
DISCHARGE INSTRUCTIONS    USE SMI EVERY DAY FOR NEXT WEEK, TRY TO USE ATLEAST 10 TIMES DURING COMMERCIALS     Even though you have been discharged from the Emergency Department, there are several things that you should do to ensure that you receive proper care:    1. DO READ your discharge instructions as these contain important information concerning your medical care.    2. If medication has been prescribed for your condition, fill the prescription as soon as possible and follow the directions on the medication.    3. RETURN AT ONCE TO THE EMERGENCY DEPARTMENT if you have any problems or concerns. These include but are not limited to fever, worsening pain(belly, chest, head, etc...), worsening shortness of breath, uncontrollable bleeding, inability to tolerate food and water, or any condition that makes you question your well-being. Also, if your symptoms do not improve in the next 12-24 hours, return to the ER or seek medical care immediately.     4. Be sure to follow up with your regular physician or specialist as instructed at discharge as this is the best way to ensure that you receive the very best of care. If you do not have a primary care physician, please contact a physician group and make an appointment.    5. Please visit [email or web address] for coupons regarding your prescriptions. It is a free service for you to use and can help reduce the cost of your medication.    We would like to thank you for coming today and our hope is that we served you and your family well during your stay

## 2024-01-12 NOTE — ED PROVIDER NOTES
follow-up.     The plan has been discussed in detail and they are aware of the specific conditions for emergent return, as well as the importance of follow-up.      New Prescriptions    NAPROXEN (NAPROSYN) 500 MG TABLET    Take 1 tablet by mouth 2 times daily as needed for Pain    ORPHENADRINE (NORFLEX) 100 MG EXTENDED RELEASE TABLET    Take 1 tablet by mouth 2 times daily for 10 days    OXYCODONE-ACETAMINOPHEN (PERCOCET) 5-325 MG PER TABLET    Take 1 tablet by mouth every 6 hours as needed for Pain for up to 3 days. Intended supply: 3 days. Take lowest dose possible to manage pain Max Daily Amount: 4 tablets       Diagnosis:  1. Bruised rib, left, initial encounter    2. Fall, initial encounter    3. Rib pain        Disposition:  Patient's disposition: Discharge to home  Patient's condition is stable.     I had an extensive discussion with the patient and/or family about their disposition and they agreed with the plan.      DISCHARGE INSTRUCTIONS:     In addition to written instructions upon discharge, return precautions and followup instructions were extensively discussed with the patient. They verbally acknowledged understanding of these instructions without any apparent barriers to learning.     When the patient was discharged from the ED, the patient was given specific instructions and information regarding their illness. The patient was verbally instructed to return to the ER urgently for any worsening symptoms OR failure of symptom improvement over the next 12-24 hours and this was also written in the discharge instructions. In the patient’s discharge instructions I told them to follow-up with their primary care physician in 1-2 days as well as to follow-up with any specialists as necessary. If they did not have a primary care physician I gave them an alternate clinic to call and make an appointment. I also included diagnosis-specific educational material in their discharge paperwork.

## 2024-01-12 NOTE — ED NOTES
Department of Emergency Medicine  FIRST PROVIDER TRIAGE NOTE             Independent MLP           1/12/24  1:54 PM EST    Date of Encounter: 1/12/24   MRN: [id number]      HPI: Cora Suárez is a 43 y.o. female who presents to the ED for Chest Pain (L side chest pain non radiating onset yesterday), Shortness of Breath (RA@ 98%, states has 3 broken ribs), and Fall (Denies hitting head, no thinners)     BROKE LEFT SIDED RIBS 1 WEEK AGO AFTER FALLING.  PCP TOLD HER TO COME HERE BECAUSE OF HER EKG.     ROS: Negative for abd pain or back pain.    PE: Gen Appearance/Constitutional: alert  HEENT: NC/NT. PERRLA,  Airway patent.     Initial Plan of Care: All treatment areas with department are currently occupied. Plan to order/Initiate the following while awaiting opening in ED: labs, EKG, and imaging studies.  Initiate Treatment-Testing, Proceed toTreatment Area When Bed Available for ED Attending/MLP to Continue Care    Electronically signed by MADELEINE Larsen CNP   DD: 1/12/24      Osmel Patel APRN - CNP  01/12/24 1710

## 2024-09-18 ENCOUNTER — OFFICE VISIT (OUTPATIENT)
Dept: FAMILY MEDICINE CLINIC | Age: 44
End: 2024-09-18
Payer: COMMERCIAL

## 2024-09-18 VITALS
OXYGEN SATURATION: 100 % | SYSTOLIC BLOOD PRESSURE: 118 MMHG | HEART RATE: 97 BPM | TEMPERATURE: 98.1 F | HEIGHT: 65 IN | DIASTOLIC BLOOD PRESSURE: 84 MMHG | BODY MASS INDEX: 34.49 KG/M2 | WEIGHT: 207 LBS

## 2024-09-18 DIAGNOSIS — J98.8 CONGESTION OF UPPER AIRWAY: ICD-10-CM

## 2024-09-18 DIAGNOSIS — J02.9 SORE THROAT: ICD-10-CM

## 2024-09-18 DIAGNOSIS — U07.1 COVID-19: Primary | ICD-10-CM

## 2024-09-18 LAB
INFLUENZA A ANTIBODY: NEGATIVE
INFLUENZA B ANTIBODY: NEGATIVE
Lab: ABNORMAL
PERFORMING INSTRUMENT: ABNORMAL
QC PASS/FAIL: ABNORMAL
S PYO AG THROAT QL: NORMAL
SARS-COV-2, POC: DETECTED

## 2024-09-18 PROCEDURE — 99214 OFFICE O/P EST MOD 30 MIN: CPT | Performed by: PHYSICIAN ASSISTANT

## 2024-09-18 PROCEDURE — G8417 CALC BMI ABV UP PARAM F/U: HCPCS | Performed by: PHYSICIAN ASSISTANT

## 2024-09-18 PROCEDURE — G8427 DOCREV CUR MEDS BY ELIG CLIN: HCPCS | Performed by: PHYSICIAN ASSISTANT

## 2024-09-18 PROCEDURE — 1036F TOBACCO NON-USER: CPT | Performed by: PHYSICIAN ASSISTANT

## 2024-09-18 RX ORDER — ALBUTEROL SULFATE 90 UG/1
2 INHALANT RESPIRATORY (INHALATION) 4 TIMES DAILY PRN
Qty: 18 G | Refills: 0 | Status: SHIPPED | OUTPATIENT
Start: 2024-09-18

## 2024-09-18 RX ORDER — HYDROXYZINE HYDROCHLORIDE 10 MG/5ML
4 SYRUP ORAL EVERY 6 HOURS PRN
Qty: 20 TABLET | Refills: 0 | Status: SHIPPED | OUTPATIENT
Start: 2024-09-18

## 2024-09-18 RX ORDER — BENZONATATE 100 MG/1
100 CAPSULE ORAL 3 TIMES DAILY PRN
Qty: 21 CAPSULE | Refills: 0 | Status: SHIPPED | OUTPATIENT
Start: 2024-09-18 | End: 2024-09-25

## 2024-12-30 ENCOUNTER — OFFICE VISIT (OUTPATIENT)
Dept: FAMILY MEDICINE CLINIC | Age: 44
End: 2024-12-30
Payer: COMMERCIAL

## 2024-12-30 VITALS
HEIGHT: 65 IN | DIASTOLIC BLOOD PRESSURE: 86 MMHG | OXYGEN SATURATION: 92 % | SYSTOLIC BLOOD PRESSURE: 126 MMHG | HEART RATE: 102 BPM | BODY MASS INDEX: 37.65 KG/M2 | WEIGHT: 226 LBS | TEMPERATURE: 97.6 F

## 2024-12-30 DIAGNOSIS — R05.9 COUGH, UNSPECIFIED TYPE: Primary | ICD-10-CM

## 2024-12-30 DIAGNOSIS — J45.21 MILD INTERMITTENT ASTHMA WITH ACUTE EXACERBATION: ICD-10-CM

## 2024-12-30 LAB
INFLUENZA A ANTIBODY: NORMAL
INFLUENZA B ANTIBODY: NORMAL
Lab: NORMAL
PERFORMING INSTRUMENT: NORMAL
QC PASS/FAIL: NORMAL
SARS-COV-2, POC: NORMAL

## 2024-12-30 PROCEDURE — 1036F TOBACCO NON-USER: CPT

## 2024-12-30 PROCEDURE — G8417 CALC BMI ABV UP PARAM F/U: HCPCS

## 2024-12-30 PROCEDURE — 99214 OFFICE O/P EST MOD 30 MIN: CPT

## 2024-12-30 PROCEDURE — G8427 DOCREV CUR MEDS BY ELIG CLIN: HCPCS

## 2024-12-30 PROCEDURE — G8484 FLU IMMUNIZE NO ADMIN: HCPCS

## 2024-12-30 PROCEDURE — 87426 SARSCOV CORONAVIRUS AG IA: CPT

## 2024-12-30 PROCEDURE — 87804 INFLUENZA ASSAY W/OPTIC: CPT

## 2024-12-30 RX ORDER — AZITHROMYCIN 250 MG/1
TABLET, FILM COATED ORAL
Qty: 6 TABLET | Refills: 0 | Status: SHIPPED | OUTPATIENT
Start: 2024-12-30 | End: 2025-01-09

## 2024-12-30 RX ORDER — DEXTROMETHORPHAN HYDROBROMIDE AND PROMETHAZINE HYDROCHLORIDE 15; 6.25 MG/5ML; MG/5ML
5 SYRUP ORAL 4 TIMES DAILY PRN
Qty: 118 ML | Refills: 0 | Status: SHIPPED | OUTPATIENT
Start: 2024-12-30 | End: 2025-01-06

## 2024-12-30 NOTE — PROGRESS NOTES
gastrointestinal, neurological, , musculoskeletal, and integument systems and related systems to the presenting problem are either stated in the history of present illness or were not pertinent or were negative for the symptoms and/or complaints related to the presenting medical problem.  Positives and pertinent negatives as per HPI.  All others reviewed and are negative.    Physical Exam   VS:    Vitals:    12/30/24 1459   BP: 126/86   Pulse: (!) 102   Temp: 97.6 °F (36.4 °C)   SpO2: 92%   Weight: 102.5 kg (226 lb)   Height: 1.651 m (5' 5\")     Oxygen Saturation Interpretation: Normal.    Constitutional:  Alert, development consistent with age. NAD.  Head:  NC/NT. Airway patent.  Moderate TTP over maxillary and frontal sinuses.   Ear: Bilateral external auditory ear canals are clear there is no cerumen, erythema or debris present.  Bilateral tympanic membrane intact, translucent and normal cone of light.  There is no serous effusion or drainage present.  Nose: Bilateral turbinates are swollen.  No septal deviation.  Rhinorrhea present.  Mouth: Posterior pharynx with mild erythema and clear postnasal drip. No tonsillar hypertrophy or exudate.   Neck:  Normal ROM. Supple. No anterior cervical adenopathy noted.  Lungs: CTAB without wheezes, rales, or rhonchi.   CV: Tachycardic rate and rhythm, normal heart sounds, without pathological murmurs, ectopy, gallops, or rubs.  GI: Bowel sounds active x4.  Abdomen is soft nontender nondistended.  There is no guarding or rebound tenderness noted.  Skin:  Normal turgor.  Warm, dry, without visible rash.  Lymphatic: No lymphangitis or adenopathy noted.  Neurological:  Oriented.  Motor functions intact.    Lab / Imaging Results   All laboratory and radiology results have been personally reviewed by myself.  Labs:  Results for orders placed or performed in visit on 12/30/24   POCT Influenza A/B   Result Value Ref Range    Influenza A Ab neg     Influenza B Ab neg    POCT

## 2025-02-28 NOTE — PROGRESS NOTES
20  Benji Floyd : 1980 Sex: female  Age 37 y.o. Subjective:  Chief Complaint   Patient presents with    Pharyngitis     pt states sore throat and feeling weak since friday          HPI:   Benji Floyd , 37 y.o. female presents to express care for evaluation of sore throat, congestion, drainage and generalized weakness/fatigue. Patient has had the symptoms for the last for 5 days. Seem to be getting worse. Actually here with daughter who has similar complaints. Patient denies any chest pain, shortness of breath, abdominal pain. She was concerned about the possibility of strep. Patient is not having any fevers. ROS:   Unless otherwise stated in this report the patient's positive and negative responses for review of systems for constitutional, eyes, ENT, cardiovascular, respiratory, gastrointestinal, neurological, , musculoskeletal, and integument systems and related systems to the presenting problem are either stated in the history of present illness or were not pertinent or were negative for the symptoms and/or complaints related to the presenting medical problem. Positives and pertinent negatives as per HPI. All others reviewed and are negative. PMH:     Past Medical History:   Diagnosis Date    Abnormal Pap smear     Anxiety     [facility]     Asthma     Hx LEEP (loop electrosurgical excision procedure), cervix, pregnancy     Major depressive disorder, recurrent episode, unspecified 2011    Mental disorders of mother, antepartum 2011       Past Surgical History:   Procedure Laterality Date    BREAST SURGERY      abscess removed from left breast     SECTION      LEEP         History reviewed. No pertinent family history.     Medications:     Current Outpatient Medications:     zolpidem (AMBIEN) 10 MG tablet, Take by mouth., Disp: , Rfl:     cefdinir (OMNICEF) 300 MG capsule, Take 1 capsule by mouth 2 times daily for 10 days, Disp: 20 Unable to request PA.  Spoke with PA team and they will manually enter in PA today.   capsule, Rfl: 0    ALPRAZolam (XANAX) 1 MG tablet, , Disp: , Rfl:     zolpidem (AMBIEN) 10 MG tablet, , Disp: , Rfl:     Allergies:   No Known Allergies    Social History:     Social History     Tobacco Use    Smoking status: Former Smoker    Smokeless tobacco: Never Used   Substance Use Topics    Alcohol use: Yes     Comment: occassionaly    Drug use: No       Patient lives at home. Physical Exam:     Vitals:    01/27/20 1146   BP: 120/76   Pulse: 60   Resp: 18   Temp: 98.2 °F (36.8 °C)   SpO2: 99%   Weight: 142 lb (64.4 kg)   Height: 5' 5\" (1.651 m)       Exam:  Physical Exam  Nurse's notes and vital signs reviewed. The patient is not hypoxic. ? General: Alert, no acute distress, patient resting comfortably Patient is not toxic or lethargic. Skin: Warm, intact, no pallor noted. There is no evidence of rash at this time. Head: Normocephalic, atraumatic  Eye: Normal conjunctiva  Ears, Nose, Throat: Right tympanic membrane clear, left tympanic membrane clear. No drainage or discharge noted. No pre- or post-auricular tenderness, erythema, or swelling noted. Moderate nasal congestion, rhinorrhea, no epistaxis, no foreign body  Posterior oropharynx shows erythema and cobblestoning but no tonsillar hypertrophy, or exudate. the uvula is midline. No trismus or drooling is noted. Moist mucous membranes. Neck: No anterior/posterior lymphadenopathy noted. No erythema, no masses, no fluctuance or induration noted. No meningeal signs. Cardiovascular: Regular Rate and Rhythm  Respiratory: No acute distress, no rhonchi, wheezing or crackles noted. No stridor or retractions are noted.   Neurological: A&O x4, normal speech  Psychiatric: Cooperative         Testing:     Results for orders placed or performed in visit on 01/27/20   POCT rapid strep A   Result Value Ref Range    Strep A Ag None Detected None Detected           Medical Decision Making:     The patient on arrival does not appear to be in any apparent

## (undated) DEVICE — GLOVE ORANGE PI 7 1/2   MSG9075

## (undated) DEVICE — 22GX5" WHITACRE SPINAL NEEDLE: Brand: MEDLINE

## (undated) DEVICE — Z DISCONTINUED APPLICATOR SURG PREP 0.35OZ 2% CHG 70% ISO ALC W/ HI LT

## (undated) DEVICE — BANDAGE ADH W1XL3IN NAT FAB WVN FLX DURABLE N ADH PD SEAL

## (undated) DEVICE — 3 ML SYRINGE LUER-LOCK TIP: Brand: MONOJECT

## (undated) DEVICE — NEEDLE HYPO 18GA L1.5IN PNK POLYPR HUB S STL THN WALL FILL

## (undated) DEVICE — GAUZE,SPONGE,4"X4",12PLY,STERILE,LF,2'S: Brand: MEDLINE

## (undated) DEVICE — 3M™ RED DOT™ MONITORING ELECTRODE WITH FOAM TAPE AND STICKY GEL 2560, 50/BAG, 20/CASE, 72/PLT: Brand: RED DOT™

## (undated) DEVICE — 6 ML SYRINGE LUER-LOCK TIP: Brand: MONOJECT

## (undated) DEVICE — 12 ML SYRINGE,LUER-LOCK TIP: Brand: MONOJECT

## (undated) DEVICE — NEEDLE HYPO 27GA L1.25IN GRY POLYPR HUB S STL REG BVL STR

## (undated) DEVICE — NEEDLE SPNL 25GA L2IN BLU SHT NEO LUM PUNC DISP